# Patient Record
Sex: MALE | Race: WHITE | NOT HISPANIC OR LATINO | URBAN - METROPOLITAN AREA
[De-identification: names, ages, dates, MRNs, and addresses within clinical notes are randomized per-mention and may not be internally consistent; named-entity substitution may affect disease eponyms.]

---

## 2021-04-23 ENCOUNTER — APPOINTMENT (OUTPATIENT)
Dept: GENERAL RADIOLOGY | Facility: HOSPITAL | Age: 21
End: 2021-04-23

## 2021-04-23 ENCOUNTER — HOSPITAL ENCOUNTER (INPATIENT)
Facility: HOSPITAL | Age: 21
LOS: 2 days | Discharge: HOME | End: 2021-04-25
Attending: INTERNAL MEDICINE | Admitting: INTERNAL MEDICINE

## 2021-04-23 ENCOUNTER — APPOINTMENT (OUTPATIENT)
Dept: MRI IMAGING | Facility: HOSPITAL | Age: 21
End: 2021-04-23

## 2021-04-23 ENCOUNTER — APPOINTMENT (OUTPATIENT)
Dept: CT IMAGING | Facility: HOSPITAL | Age: 21
End: 2021-04-23

## 2021-04-23 DIAGNOSIS — I63.9 CEREBROVASCULAR ACCIDENT (CVA), UNSPECIFIED MECHANISM (HCC): Primary | ICD-10-CM

## 2021-04-23 DIAGNOSIS — Z74.09 IMPAIRED FUNCTIONAL MOBILITY, BALANCE, GAIT, AND ENDURANCE: ICD-10-CM

## 2021-04-23 PROBLEM — I48.92 ATRIAL FLUTTER (HCC): Status: ACTIVE | Noted: 2021-04-23

## 2021-04-23 PROBLEM — N28.9 RENAL INSUFFICIENCY: Status: ACTIVE | Noted: 2021-04-23

## 2021-04-23 LAB
ALBUMIN SERPL-MCNC: 4.9 G/DL (ref 3.5–5.2)
ALBUMIN/GLOB SERPL: 1.6 G/DL
ALP SERPL-CCNC: 108 U/L (ref 39–117)
ALT SERPL W P-5'-P-CCNC: 38 U/L (ref 1–41)
AMPHET+METHAMPHET UR QL: NEGATIVE
AMPHETAMINES UR QL: NEGATIVE
ANION GAP SERPL CALCULATED.3IONS-SCNC: 14 MMOL/L (ref 5–15)
AST SERPL-CCNC: 19 U/L (ref 1–40)
B PARAPERT DNA SPEC QL NAA+PROBE: NOT DETECTED
B PERT DNA SPEC QL NAA+PROBE: NOT DETECTED
BARBITURATES UR QL SCN: NEGATIVE
BENZODIAZ UR QL SCN: NEGATIVE
BILIRUB SERPL-MCNC: 0.5 MG/DL (ref 0–1.2)
BUN SERPL-MCNC: 15 MG/DL (ref 6–20)
BUN/CREAT SERPL: 12.9 (ref 7–25)
BUPRENORPHINE SERPL-MCNC: NEGATIVE NG/ML
C PNEUM DNA NPH QL NAA+NON-PROBE: NOT DETECTED
CALCIUM SPEC-SCNC: 8.8 MG/DL (ref 8.6–10.5)
CANNABINOIDS SERPL QL: NEGATIVE
CHLORIDE SERPL-SCNC: 100 MMOL/L (ref 98–107)
CO2 SERPL-SCNC: 21 MMOL/L (ref 22–29)
COCAINE UR QL: NEGATIVE
CREAT SERPL-MCNC: 1.16 MG/DL (ref 0.76–1.27)
DEPRECATED RDW RBC AUTO: 38.7 FL (ref 37–54)
ERYTHROCYTE [DISTWIDTH] IN BLOOD BY AUTOMATED COUNT: 12.2 % (ref 12.3–15.4)
FLUAV SUBTYP SPEC NAA+PROBE: NOT DETECTED
FLUBV RNA ISLT QL NAA+PROBE: NOT DETECTED
GFR SERPL CREATININE-BSD FRML MDRD: 79 ML/MIN/1.73
GLOBULIN UR ELPH-MCNC: 3 GM/DL
GLUCOSE BLDC GLUCOMTR-MCNC: 141 MG/DL (ref 70–130)
GLUCOSE SERPL-MCNC: 142 MG/DL (ref 65–99)
HADV DNA SPEC NAA+PROBE: NOT DETECTED
HBA1C MFR BLD: 4.8 % (ref 4.8–5.6)
HCOV 229E RNA SPEC QL NAA+PROBE: NOT DETECTED
HCOV HKU1 RNA SPEC QL NAA+PROBE: NOT DETECTED
HCOV NL63 RNA SPEC QL NAA+PROBE: NOT DETECTED
HCOV OC43 RNA SPEC QL NAA+PROBE: NOT DETECTED
HCT VFR BLD AUTO: 46.9 % (ref 37.5–51)
HGB BLD-MCNC: 16.2 G/DL (ref 13–17.7)
HMPV RNA NPH QL NAA+NON-PROBE: NOT DETECTED
HPIV1 RNA SPEC QL NAA+PROBE: NOT DETECTED
HPIV2 RNA SPEC QL NAA+PROBE: NOT DETECTED
HPIV3 RNA NPH QL NAA+PROBE: NOT DETECTED
HPIV4 P GENE NPH QL NAA+PROBE: NOT DETECTED
M PNEUMO IGG SER IA-ACNC: NOT DETECTED
MCH RBC QN AUTO: 30.2 PG (ref 26.6–33)
MCHC RBC AUTO-ENTMCNC: 34.5 G/DL (ref 31.5–35.7)
MCV RBC AUTO: 87.3 FL (ref 79–97)
METHADONE UR QL SCN: NEGATIVE
OPIATES UR QL: NEGATIVE
OXYCODONE UR QL SCN: NEGATIVE
PCP UR QL SCN: NEGATIVE
PLATELET # BLD AUTO: 308 10*3/MM3 (ref 140–450)
PMV BLD AUTO: 8.5 FL (ref 6–12)
POTASSIUM SERPL-SCNC: 3.8 MMOL/L (ref 3.5–5.2)
PROPOXYPH UR QL: NEGATIVE
PROT SERPL-MCNC: 7.9 G/DL (ref 6–8.5)
RBC # BLD AUTO: 5.37 10*6/MM3 (ref 4.14–5.8)
RHINOVIRUS RNA SPEC NAA+PROBE: NOT DETECTED
RSV RNA NPH QL NAA+NON-PROBE: NOT DETECTED
SARS-COV-2 RNA NPH QL NAA+NON-PROBE: NOT DETECTED
SODIUM SERPL-SCNC: 135 MMOL/L (ref 136–145)
SODIUM UR-SCNC: 65 MMOL/L
TRICYCLICS UR QL SCN: NEGATIVE
TSH SERPL DL<=0.05 MIU/L-ACNC: 0.93 UIU/ML (ref 0.27–4.2)
WBC # BLD AUTO: 16.2 10*3/MM3 (ref 3.4–10.8)

## 2021-04-23 PROCEDURE — 93005 ELECTROCARDIOGRAM TRACING: CPT | Performed by: NURSE PRACTITIONER

## 2021-04-23 PROCEDURE — 70496 CT ANGIOGRAPHY HEAD: CPT

## 2021-04-23 PROCEDURE — 0042T HC CT CEREBRAL PERFUSION W/WO CONTRAST: CPT

## 2021-04-23 PROCEDURE — 84443 ASSAY THYROID STIM HORMONE: CPT | Performed by: NURSE PRACTITIONER

## 2021-04-23 PROCEDURE — 84300 ASSAY OF URINE SODIUM: CPT | Performed by: NURSE PRACTITIONER

## 2021-04-23 PROCEDURE — 0202U NFCT DS 22 TRGT SARS-COV-2: CPT | Performed by: NURSE PRACTITIONER

## 2021-04-23 PROCEDURE — 71045 X-RAY EXAM CHEST 1 VIEW: CPT

## 2021-04-23 PROCEDURE — 25010000002 ALTEPLASE PER 1 MG: Performed by: PSYCHIATRY & NEUROLOGY

## 2021-04-23 PROCEDURE — 0 IOPAMIDOL PER 1 ML: Performed by: INTERNAL MEDICINE

## 2021-04-23 PROCEDURE — 82962 GLUCOSE BLOOD TEST: CPT

## 2021-04-23 PROCEDURE — 82570 ASSAY OF URINE CREATININE: CPT | Performed by: NURSE PRACTITIONER

## 2021-04-23 PROCEDURE — 99223 1ST HOSP IP/OBS HIGH 75: CPT | Performed by: INTERNAL MEDICINE

## 2021-04-23 PROCEDURE — 85027 COMPLETE CBC AUTOMATED: CPT | Performed by: NURSE PRACTITIONER

## 2021-04-23 PROCEDURE — 3E03317 INTRODUCTION OF OTHER THROMBOLYTIC INTO PERIPHERAL VEIN, PERCUTANEOUS APPROACH: ICD-10-PCS | Performed by: INTERNAL MEDICINE

## 2021-04-23 PROCEDURE — 70498 CT ANGIOGRAPHY NECK: CPT

## 2021-04-23 PROCEDURE — 70450 CT HEAD/BRAIN W/O DYE: CPT

## 2021-04-23 PROCEDURE — 70551 MRI BRAIN STEM W/O DYE: CPT

## 2021-04-23 PROCEDURE — 80053 COMPREHEN METABOLIC PANEL: CPT | Performed by: NURSE PRACTITIONER

## 2021-04-23 PROCEDURE — 83036 HEMOGLOBIN GLYCOSYLATED A1C: CPT | Performed by: NURSE PRACTITIONER

## 2021-04-23 PROCEDURE — 80306 DRUG TEST PRSMV INSTRMNT: CPT | Performed by: NURSE PRACTITIONER

## 2021-04-23 PROCEDURE — 25010000002 KETOROLAC TROMETHAMINE PER 15 MG: Performed by: NURSE PRACTITIONER

## 2021-04-23 RX ORDER — ASPIRIN 325 MG
325 TABLET ORAL DAILY
Status: DISCONTINUED | OUTPATIENT
Start: 2021-04-24 | End: 2021-04-24

## 2021-04-23 RX ORDER — SODIUM CHLORIDE 0.9 % (FLUSH) 0.9 %
10 SYRINGE (ML) INJECTION AS NEEDED
Status: DISCONTINUED | OUTPATIENT
Start: 2021-04-23 | End: 2021-04-25 | Stop reason: HOSPADM

## 2021-04-23 RX ORDER — SODIUM CHLORIDE 9 MG/ML
100 INJECTION, SOLUTION INTRAVENOUS ONCE
Status: COMPLETED | OUTPATIENT
Start: 2021-04-23 | End: 2021-04-23

## 2021-04-23 RX ORDER — ASPIRIN 300 MG/1
300 SUPPOSITORY RECTAL DAILY
Status: DISCONTINUED | OUTPATIENT
Start: 2021-04-24 | End: 2021-04-24

## 2021-04-23 RX ORDER — ACETAMINOPHEN 650 MG/1
650 SUPPOSITORY RECTAL EVERY 4 HOURS PRN
Status: DISCONTINUED | OUTPATIENT
Start: 2021-04-23 | End: 2021-04-25 | Stop reason: HOSPADM

## 2021-04-23 RX ORDER — ATORVASTATIN CALCIUM 40 MG/1
80 TABLET, FILM COATED ORAL NIGHTLY
Status: DISCONTINUED | OUTPATIENT
Start: 2021-04-23 | End: 2021-04-25

## 2021-04-23 RX ORDER — SODIUM CHLORIDE 0.9 % (FLUSH) 0.9 %
10 SYRINGE (ML) INJECTION EVERY 12 HOURS SCHEDULED
Status: DISCONTINUED | OUTPATIENT
Start: 2021-04-23 | End: 2021-04-25 | Stop reason: HOSPADM

## 2021-04-23 RX ORDER — LORAZEPAM 2 MG/ML
1 INJECTION INTRAMUSCULAR ONCE
Status: DISCONTINUED | OUTPATIENT
Start: 2021-04-23 | End: 2021-04-24

## 2021-04-23 RX ORDER — KETOROLAC TROMETHAMINE 15 MG/ML
15 INJECTION, SOLUTION INTRAMUSCULAR; INTRAVENOUS ONCE
Status: COMPLETED | OUTPATIENT
Start: 2021-04-23 | End: 2021-04-23

## 2021-04-23 RX ADMIN — IOPAMIDOL 115 ML: 755 INJECTION, SOLUTION INTRAVENOUS at 18:27

## 2021-04-23 RX ADMIN — KETOROLAC TROMETHAMINE 15 MG: 15 INJECTION, SOLUTION INTRAMUSCULAR; INTRAVENOUS at 20:08

## 2021-04-23 RX ADMIN — SODIUM CHLORIDE 100 ML: 9 INJECTION, SOLUTION INTRAVENOUS at 20:12

## 2021-04-23 RX ADMIN — SODIUM CHLORIDE, PRESERVATIVE FREE 10 ML: 5 INJECTION INTRAVENOUS at 21:07

## 2021-04-23 NOTE — H&P
Intensive Care Admission Note     CVA (cerebral vascular accident) (CMS/[facility])    History of Present Illness     Cristian Perez is a 20 y.o. male who presented to [facility] ED @ 1523 w/ acute onset (1503) of R facial droop, L facial numbness, HA, & LUE weakness/parasthesias.  He was found to be in atrial flutter as well which is a new finding.  NIHSS was 4.  He was transferred to our facility and was initially admitted to the floor as imaging did not reveal any acute findings.    We were contacted by the CVA Navigator and informed that Neurology felt there was a focal lesion and he would benefit from tPA.  He is being transferred to the ICU.    In speaking w/ the patient he denies any H/O medical issues.  He said that he had some pain in his chest ~ 2 weeks ago that was self-limiting, otherwise he has been in his regular level of health.     Problem List, Surgical History, Family, Social History, and ROS     CVA (cerebral vascular accident), possible, status post TPA 4/23/2021     Atrial flutter (CMS/[facility])    Renal insufficiency, based upon outside hospital labs    No past surgical history on file.    Allergies   Allergen Reactions   • Hydralazine Shortness Of Breath     Tachycardia, rash, and chest pain       No current facility-administered medications on file prior to encounter.     No current outpatient medications on file prior to encounter.     MEDICATION LIST AND ALLERGIES REVIEWED.    No family history on file.  Social History     Tobacco Use   • Smoking status: Never Smoker   • Smokeless tobacco: Never Used   Substance Use Topics   • Alcohol use: Not on file   • Drug use: Not on file     Social History     Social History Narrative   • Not on file     FAMILY AND SOCIAL HISTORY REVIEWED.    Review of Systems   Neurological: Positive for facial asymmetry, weakness, numbness and headaches.     ALL OTHER SYSTEMS REVIEWED AND ARE NEGATIVE.     Physical Exam and Clinical Information   BP (!) 157/101   Pulse 104    "Temp 99.2 °F (37.3 °C) (Oral)   Resp 16   Ht 182.9 cm (72\")   Wt 112 kg (247 lb 2.2 oz)   SpO2 96%   BMI 33.52 kg/m²   Physical Exam  Vitals reviewed.   Constitutional:       General: He is not in acute distress.     Appearance: Normal appearance. He is normal weight. He is not ill-appearing or diaphoretic.   HENT:      Head: Normocephalic and atraumatic.      Nose: Nose normal. No congestion.      Mouth/Throat:      Mouth: Mucous membranes are moist.      Pharynx: No oropharyngeal exudate.   Eyes:      Extraocular Movements: Extraocular movements intact.      Conjunctiva/sclera: Conjunctivae normal.      Pupils: Pupils are equal, round, and reactive to light.   Cardiovascular:      Rate and Rhythm: Regular rhythm. Tachycardia present.      Pulses: Normal pulses.      Heart sounds: Murmur heard.     Pulmonary:      Effort: Pulmonary effort is normal. No respiratory distress.      Breath sounds: Normal breath sounds. No stridor. No wheezing, rhonchi or rales.   Chest:      Chest wall: No tenderness.   Abdominal:      General: Abdomen is flat. Bowel sounds are normal. There is no distension.      Tenderness: There is no abdominal tenderness.   Musculoskeletal:         General: No swelling.      Cervical back: Normal range of motion and neck supple. No rigidity.      Comments: Patient has approximately 3 out of 5  strength in his left upper extremity.  No facial droop is noted at this time.   Skin:     General: Skin is warm.      Capillary Refill: Capillary refill takes less than 2 seconds.   Neurological:      Mental Status: He is alert.   Psychiatric:         Mood and Affect: Mood normal.             I reviewed the patient's results and images.     Impression       CVA (cerebral vascular accident), possible, status post TPA 4/23/2021     Atrial flutter (CMS/HCC)    Renal insufficiency, based upon outside hospital labs      Plan/Recommendations     - Neuro ICU admission.  - CVA Navigator to order tPA & " post-tPA protocol; close neurovascular changes.  - 24 H post-tPA head CT to r/o bleed & prn for neuro changes  - STAT labs, urine studies, UDS now.  - check ECG, ECHO in am  - Pending the studies, further work-up may be necessary including cardiology evaluation.    Patient is at high risk of worsening secondary to possible acute ischemic stroke and will need to be watched very closely secondary to TPA administration.    Sai Medina MD, [facility]  Pulmonary and Critical Care Medicine  04/23/21 20:10 EDT         CC: Provider, No Known

## 2021-04-23 NOTE — NURSING NOTE
"Stroke Navigator CODE STROKE    TIME NOTIFIED OF PATIENTS ARRIVAL 1813, TIME OF PATIENT EVALUATION 1813    PHOENIX Perez is a 20 y.o. right-handed male with no known medical history whom I am evaluating as a transfer from [facility] for possible acute stroke.     Patient presented to OSH with complaints of a sudden onset of left sided weakness, paraesthesia, and right facial droop which began at approximately 1500 while driving home from work.  He presented to his local ED where he underwent a CT head without contrast.  This was negative for hemorrhage or acute process.  He was also noted to be hypertensive upon arrival with SBP in the 190's.  He was given IV hydralazine for blood pressure management and experienced an allergic reaction including tachycardia, rash, chest pain, and shortness of breath.  He was treated with IV benadryl and solumedrol.  EKG was obtained and was interpreted by the ED physician who states he was in atrial flutter, new for the patient.  The stroke team was contacted and he was accepted for transfer.  The patient was discussed with Dr. Chapman who recommended IV alteplase treatment.  The ED physician at the OSH discussed with the patient and he refused treatment as \"he was afraid he would have an anaphylactic reaction\".      The patient was airlifted to our facility and upon arrival was taken immediately to the CT scanner for CTA head/neck and CT perfusion scan.  On assessment the patient is alert, oriented, and follows commands.  Pupils are equal and round.  EOMI with no evidence of visual field deficits.  There is no evidence of deviation however the patient does seem to have right gaze preference. He has right eye ptosis and right facial droop.  Tongue is midline.  Speech is mildly dysarthric at times but fluent with no evidence of aphasia.  He has full range of motion of his right upper and lower extremity.  He has mild drift in his left upper and lower extremity.  " Sensation is decreased in the left face, upper extremity, and lower extremity.  Finger-to-nose and heel-to-shin are negative for ataxia.  He does complain of a mild bifrontal headache at this time.  Blood pressure is 145/90 in the CT scanner.  Gait is unsteady.    CTA head and neck as well as CT perfusion is negative for flow-limiting stenosis or large vessel occlusion however due to his significant left-sided deficits he was taken for stat MRI of the brain without contrast to further evaluate for diffusion deficits as he is still apprehensive about receiving IV alteplase therapy.  MRI was reviewed by Dr. Reid, Dr. Spain, and Dr. Major.  This does reveal a small abnormality within the right pontine region, which could be artifact however due to the patient's debilitating symptoms it was recommended that the patient proceed with IV alteplase administration.  After completing the IV alteplase checklist with the patient as well as reviewing the risks/benefits, he agreed to proceed with administration.  Blood pressure prior to bolus was 156/96.  He was transferred to the neurological ICU for further evaluation.  Patient was discussed with Dr. Medina and he was transferred from MRI to the intensive care unit with the floor nurse.    AFTER REVIEWING THE PATIENT'S EKG FROM OSH THERE IS NO EVIDENCE OF ATRIAL FLUTTER.    Code Stroke location: Transfer    · Last known well: 1500    · GCS: 15  · Baseline level of function known: yes. Modified Cresencio: 0   · Current symptoms include; extremity weakness, extremity numbness, facial droop, dysarthria and headache, affecting primarily the left upper extremity and lower extremity. Facial droop is present on right.  Symptoms are currently unchanged.   · Patient is a candidate for thrombolytic therapy.  There was a delay in IV alteplase administration secondary to patient being hesitant to receive therapy.  · Patient is not a candidate for Neuro Intervention due to no LVO (large  vessel occlusion) or flow-limiting stenosis present on the CTA head/neck or CT perfusion scan.    Stroke risk factors: hypertension and physical inactivity and/or obesity.     Prior stroke history: no    Antiplatelet therapy: none  Anticoagulation: none     · Imaging performed: CT head, MRI brain, CTA head, CTA neck and CT perfusion      NIHSS    Interval: baseline  1a. Level of Consciousness: 0-->Alert, keenly responsive  1b. LOC Questions: 0-->Answers both questions correctly  1c. LOC Commands: 0-->Performs both tasks correctly  2. Best Gaze: 0-->Normal  3. Visual: 0-->No visual loss  4. Facial Palsy: 2-->Partial paralysis (total or near-total paralysis of lower face)  5a. Motor Arm, Left: 1-->Drift, limb holds 90 (or 45) degrees, but drifts down before full 10 seconds, does not hit bed or other support  5b. Motor Arm, Right: 0-->No drift, limb holds 90 (or 45) degrees for full 10 secs  6a. Motor Leg, Left: 1-->Drift, leg falls by the end of the 5-sec period but does not hit bed  6b. Motor Leg, Right: 0-->No drift, leg holds 30 degree position for full 5 secs  7. Limb Ataxia: 0-->Absent  8. Sensory: 1-->Mild-to-moderate sensory loss, patient feels pinprick is less sharp or is dull on the affected side, or there is a loss of superficial pain with pinprick, but patient is aware of being touched  9. Best Language: 0-->No aphasia, normal  10. Dysarthria: 0-->Normal  11. Extinction and Inattention (formerly Neglect): 0-->No abnormality    Total (NIH Stroke Scale): 5       PLAN    There is no evidence of flow limiting stenosis or large vessel occlusion ischemic stroke present on the CTA head/neck or CT perfusion scan.  As such, there is no role for acute neuro intervention.        Due to the patient's bilateral symptoms, cannot exclude brainstem stroke therefore he will be taken for stat MRI of the brain without contrast.  This was completed and reviewed by Dr. Spain and Dr. Major at 1902.  This demonstrates a  potential small area of restricted diffusion within the right pontine area which may be artifact, however due to the patient's persistent and disabling symptoms and low risk for intracerebral hemorrhage IV alteplase is recommended.      After completing the IV alteplase checklist and discussing risks/benefits with the patient, on several occasions, he is agreeable to proceed with treatment.  Repeat BP is 156/96.  IV alteplase bolus was started at 1914.    TIA/CVA with thrombolytic therapy has been initiated  NPO until nursing dysphagia screen completed  2D echocardiogram in a.m.  A1c and LDL in a.m.  Bedrest for tonight  Repeat CT head on 4/24 at 2015  ASA 325mg after 24 hour CT scan if no evidence of hemorrhage  Lipitor 80mg nightly    Formal consult to follow in AM.    Barbie Maya RN EXTENDER/STROKE NAVIGATOR

## 2021-04-23 NOTE — NURSING NOTE
ACC REVIEW REPORT: [facility]        PATIENT NAME: Cristian Perez    PATIENT ID: [id number]      COVID-19 ACC SCREENING       DOES THE PATIENT HAVE A FEVER GREATER THAN OR EQUAL .4: no    IS THE PATIENT EXPERIENCING SHORTNESS OF BREATH: no    DOES THE PATIENT HAVE A COUGH: no    DOES THE PATIENT HAVE ANY OF THE FOLLOWING RISK FACTORS:    EXPOSURE TO SUSPECTED OR KNOWN COVID-19: no    RECENT TRAVEL HISTORY TO ENDEMIC AREA (DOMESTIC/LOCAL): no    IS THE PATIENT A HEALTHCARE WORKER: no    HAS THE PATIENT EXPERIENCED A LOSS OF SENSE OF TASTE OR SMELL:  unknown    HAS THE PATIENT BEEN TESTED FOR COVID-19: yes    DATE TESTED: 2mo ago    LAB TESTING SENT TO: unknown - results neg      BED: S 337    BED TYPE: telemetry    BED GIVEN TO: Rossy in the ED    TIME BED GIVEN: 1715    TODAY'S DATE: 4/23/2021    TRANSFER DATE: 4-23-21    ETA: pending [employer]    TRANSFERRING FACILITY: [facility] PHONE # : [phone]    TRANSFERRING MD: Marion Cramer    ACCEPTING PROVIDER: MELIATOR  Saida)    NEUROLOGY PHYSICIAN: Adela    DATE/TIME REQUEST RECEIVED: 4-23-21@[address] [place] [address] RN: Natalee Milligan    REPORT FROM: Rossy    TIME REPORT TAKEN: 1705    DIAGNOSIS: CVA    REASON FOR TRANSFER TO [facility]: higher level of care    TRANSPORTATION: flying - [employer]    CLINICAL REASON FOR TRANSFER TO [facility]: 20 y.o. presented to [facility] ED at 1523 - he developed sx at 1503 - rt facial droop, left facial numbness, LUE weakness/parasthesias  Also found to have aflutter  Pt has no known medical hx      CLINICAL INFORMATION    HEIGHT: 6'    WEIGHT: 240#    ALLERGIES: sulfa, demerol    INFECTIOUS DISEASE:     ISOLATION:     VITAL SIGNS:   TIME: 1710  TEMP: 98.5  PULSE: 119  B/P: 201/103  RESP: 18      LAB INFORMATION: bun 17, Cr 1.35, glucose 97, PT/INR: 9.8/0.95, PTT 26.5    CULTURE INFORMATION:     MEDS/IV FLUIDS: #18 left ac  meds given:  Hydralazine 20mg IVP at 1540  Diltiazem 10mg at 1608  Solumedrol  Diltiazem  gtt @ 10mg  20mg labatolol IV @ 1710  Benadryl 25mg IV @ 1630    (benadryl & solumedrol given for possible suspicion of reaction to meds as pt developed flushing of the face)      CARDIAC SYSTEM:    CHEST PAIN: no    RHYTHM: a flutter    Is patient taking or has patient been given any drugs that could increase bleeding? no    CARDIAC NOTES: new arrythmia      RESPIRATORY SYSTEM:    OXYGEN: no    O2 SAT: 99% on RA    RESPIRATORY STATUS: no soa      CNS/MUSCULOSKELETAL      LAST KNOWN WELL: 4/23@1503          NIHSS    Survey Item  0: Means Alert  1: Drowsy or Answer Correctly  2: Incorrect, Forced, Can't Resist Gravity  3: Complete or No Effort  4: No Movement  NT: Not Testable Acceptable As Noted Above      1A: Level of Consciousness: 0    1B: LOC Questions (month, age) : 0    1C: LOC Commands (open/close eyes, make a fist & let go): 0    2:  Best Gaze (eyes open-pt follows examiner's fingers or face): 0    3:  Visual (introduce visual stimulus/threat to pt's visual field quad. Cover 1 eye and hold up fingers in all 4 quadrants) : 0    4.  Facial Palsy (show teeth, raise eyebrows and squeeze eyes tightly shut): 3    5A: Motor Arm-Left (elevate extremity to 90 degrees and score drift/movement.  Count to 10 aloud and use fingers for visual cue): 1    5B:  Motor Arm-Right (elevate extremity to 90 degrees and score drift/movement.  Count to 10 aloud and use fingers for visual cue): 0    6A:  Motor Leg-Left (elevate extremity to 30 degrees and score drift/movement.  Count to 5 out loud and use fingers for visual cue): 0    6B:  Motor Leg-Right (elevate extremity to 30 degrees and score drift/movement.  Count to 5 out loud and use fingers for visual cue): 0    7:  Limb Ataxia- finger to nose, heel down shin: 0    8:  Sensory- pin prick to face, arms, trunk, and legs. Compare sharpness side to side: 0    9:  Best Language- name, items, describe picture, and read sentences.  Do not forget glasses if they normally wear them:  0    10: Dysarthria- elevate speech clarity by pt reading or repeating words on a list: 0    11: Extinction and Inattention- Use information from prior testing or double simultaneous stimuli testing to identify neglect. Face, arms, legs and visual field: 0    Total NIHSS Score: 4  Date: 4-23-21  Time of NIHSS Assessment: 1523    CAT SCAN RESULTS: neg    CNS/MUSCULOSKELETAL NOTES: facial drooping has worsened since arrival; pt was able to ambulate to stretcher      GI//GY      ABDOMINAL PAIN: none reported    VOMITING: no    DIARRHEA: no    NAUSEA: no    PAST MEDICAL HISTORY: no medical hx      Melisa Milligan, RN  4/23/2021  17:14 EDT

## 2021-04-24 ENCOUNTER — APPOINTMENT (OUTPATIENT)
Dept: MRI IMAGING | Facility: HOSPITAL | Age: 21
End: 2021-04-24

## 2021-04-24 ENCOUNTER — APPOINTMENT (OUTPATIENT)
Dept: CARDIOLOGY | Facility: HOSPITAL | Age: 21
End: 2021-04-24

## 2021-04-24 LAB
ANION GAP SERPL CALCULATED.3IONS-SCNC: 13 MMOL/L (ref 5–15)
BH CV ECHO MEAS - AO MAX PG (FULL): 4.2 MMHG
BH CV ECHO MEAS - AO MAX PG: 8.4 MMHG
BH CV ECHO MEAS - AO MEAN PG (FULL): 2.4 MMHG
BH CV ECHO MEAS - AO MEAN PG: 4.3 MMHG
BH CV ECHO MEAS - AO ROOT AREA (BSA CORRECTED): 1.3
BH CV ECHO MEAS - AO ROOT AREA: 7.3 CM^2
BH CV ECHO MEAS - AO ROOT DIAM: 3 CM
BH CV ECHO MEAS - AO V2 MAX: 145 CM/SEC
BH CV ECHO MEAS - AO V2 MEAN: 94.6 CM/SEC
BH CV ECHO MEAS - AO V2 VTI: 34.3 CM
BH CV ECHO MEAS - ASC AORTA: 2.9 CM
BH CV ECHO MEAS - AVA(I,A): 2.3 CM^2
BH CV ECHO MEAS - AVA(I,D): 2.3 CM^2
BH CV ECHO MEAS - AVA(V,A): 2.6 CM^2
BH CV ECHO MEAS - AVA(V,D): 2.6 CM^2
BH CV ECHO MEAS - BSA(HAYCOCK): 2.4 M^2
BH CV ECHO MEAS - BSA: 2.3 M^2
BH CV ECHO MEAS - BZI_BMI: 33.4 KILOGRAMS/M^2
BH CV ECHO MEAS - BZI_METRIC_HEIGHT: 182.9 CM
BH CV ECHO MEAS - BZI_METRIC_WEIGHT: 111.6 KG
BH CV ECHO MEAS - CI(CUBED): 2.6 L/MIN/M^2
BH CV ECHO MEAS - CI(MOD-SP2): 2.4 L/MIN/M^2
BH CV ECHO MEAS - CI(MOD-SP4): 3.8 L/MIN/M^2
BH CV ECHO MEAS - CI(TEICH): 2.3 L/MIN/M^2
BH CV ECHO MEAS - CO(CUBED): 6 L/MIN
BH CV ECHO MEAS - CO(MOD-SP2): 5.7 L/MIN
BH CV ECHO MEAS - CO(MOD-SP4): 8.8 L/MIN
BH CV ECHO MEAS - CO(TEICH): 5.3 L/MIN
BH CV ECHO MEAS - EDV(CUBED): 112.4 ML
BH CV ECHO MEAS - EDV(MOD-SP2): 119 ML
BH CV ECHO MEAS - EDV(MOD-SP4): 165 ML
BH CV ECHO MEAS - EDV(TEICH): 108.9 ML
BH CV ECHO MEAS - EF(CUBED): 82.3 %
BH CV ECHO MEAS - EF(MOD-BP): 79 %
BH CV ECHO MEAS - EF(MOD-SP2): 73.1 %
BH CV ECHO MEAS - EF(MOD-SP4): 82.4 %
BH CV ECHO MEAS - EF(TEICH): 75 %
BH CV ECHO MEAS - ESV(CUBED): 19.8 ML
BH CV ECHO MEAS - ESV(MOD-SP2): 32 ML
BH CV ECHO MEAS - ESV(MOD-SP4): 29 ML
BH CV ECHO MEAS - ESV(TEICH): 27.2 ML
BH CV ECHO MEAS - FS: 43.9 %
BH CV ECHO MEAS - IVS/LVPW: 0.96
BH CV ECHO MEAS - IVSD: 0.86 CM
BH CV ECHO MEAS - LA DIMENSION: 4 CM
BH CV ECHO MEAS - LA/AO: 1.3
BH CV ECHO MEAS - LAD MAJOR: 4.8 CM
BH CV ECHO MEAS - LAT PEAK E' VEL: 14.3 CM/SEC
BH CV ECHO MEAS - LATERAL E/E' RATIO: 6.7
BH CV ECHO MEAS - LV DIASTOLIC VOL/BSA (35-75): 70.9 ML/M^2
BH CV ECHO MEAS - LV MASS(C)D: 143.7 GRAMS
BH CV ECHO MEAS - LV MASS(C)DI: 61.8 GRAMS/M^2
BH CV ECHO MEAS - LV MAX PG: 4.2 MMHG
BH CV ECHO MEAS - LV MEAN PG: 2 MMHG
BH CV ECHO MEAS - LV SYSTOLIC VOL/BSA (12-30): 12.5 ML/M^2
BH CV ECHO MEAS - LV V1 MAX: 102.4 CM/SEC
BH CV ECHO MEAS - LV V1 MEAN: 64 CM/SEC
BH CV ECHO MEAS - LV V1 VTI: 21.6 CM
BH CV ECHO MEAS - LVIDD: 4.8 CM
BH CV ECHO MEAS - LVIDS: 2.7 CM
BH CV ECHO MEAS - LVLD AP2: 8.1 CM
BH CV ECHO MEAS - LVLD AP4: 8.7 CM
BH CV ECHO MEAS - LVLS AP2: 6.7 CM
BH CV ECHO MEAS - LVLS AP4: 6.6 CM
BH CV ECHO MEAS - LVOT AREA (M): 3.8 CM^2
BH CV ECHO MEAS - LVOT AREA: 3.7 CM^2
BH CV ECHO MEAS - LVOT DIAM: 2.2 CM
BH CV ECHO MEAS - LVPWD: 0.89 CM
BH CV ECHO MEAS - MED PEAK E' VEL: 10.9 CM/SEC
BH CV ECHO MEAS - MEDIAL E/E' RATIO: 8.7
BH CV ECHO MEAS - MM HR: 65 BPM
BH CV ECHO MEAS - MM R-R INT: 0.92 SEC
BH CV ECHO MEAS - MR MAX PG: 88 MMHG
BH CV ECHO MEAS - MR MAX VEL: 466.9 CM/SEC
BH CV ECHO MEAS - MV A MAX VEL: 52.8 CM/SEC
BH CV ECHO MEAS - MV DEC TIME: 0.19 SEC
BH CV ECHO MEAS - MV E MAX VEL: 96.7 CM/SEC
BH CV ECHO MEAS - MV E/A: 1.8
BH CV ECHO MEAS - MV MAX PG: 3.8 MMHG
BH CV ECHO MEAS - MV MEAN PG: 1.4 MMHG
BH CV ECHO MEAS - MV V2 MAX: 97 CM/SEC
BH CV ECHO MEAS - MV V2 MEAN: 55 CM/SEC
BH CV ECHO MEAS - MV V2 VTI: 29.4 CM
BH CV ECHO MEAS - MVA(VTI): 2.7 CM^2
BH CV ECHO MEAS - PA ACC SLOPE: 586.5 CM/SEC^2
BH CV ECHO MEAS - PA ACC TIME: 0.2 SEC
BH CV ECHO MEAS - PA MAX PG: 7.7 MMHG
BH CV ECHO MEAS - PA PR(ACCEL): -10.5 MMHG
BH CV ECHO MEAS - PA V2 MAX: 138.5 CM/SEC
BH CV ECHO MEAS - RAP SYSTOLE: 3 MMHG
BH CV ECHO MEAS - RVDD: 2 CM
BH CV ECHO MEAS - RVSP: 23 MMHG
BH CV ECHO MEAS - SI(AO): 107 ML/M^2
BH CV ECHO MEAS - SI(CUBED): 39.8 ML/M^2
BH CV ECHO MEAS - SI(LVOT): 34.3 ML/M^2
BH CV ECHO MEAS - SI(MOD-SP2): 37.4 ML/M^2
BH CV ECHO MEAS - SI(MOD-SP4): 58.5 ML/M^2
BH CV ECHO MEAS - SI(TEICH): 35.1 ML/M^2
BH CV ECHO MEAS - SV(AO): 249 ML
BH CV ECHO MEAS - SV(CUBED): 92.6 ML
BH CV ECHO MEAS - SV(LVOT): 79.8 ML
BH CV ECHO MEAS - SV(MOD-SP2): 87 ML
BH CV ECHO MEAS - SV(MOD-SP4): 136 ML
BH CV ECHO MEAS - SV(TEICH): 81.7 ML
BH CV ECHO MEAS - TAPSE (>1.6): 3 CM
BH CV ECHO MEAS - TR MAX PG: 20 MMHG
BH CV ECHO MEAS - TR MAX VEL: 221.6 CM/SEC
BH CV ECHO MEAS - TV MAX PG: 2.8 MMHG
BH CV ECHO MEAS - TV V2 MAX: 83 CM/SEC
BH CV ECHO MEASUREMENTS AVERAGE E/E' RATIO: 7.67
BH CV VAS BP RIGHT ARM: NORMAL MMHG
BH CV XLRA - RV BASE: 3.1 CM
BH CV XLRA - RV LENGTH: 5.8 CM
BH CV XLRA - RV MID: 2.9 CM
BH CV XLRA - TDI S': 16.1 CM/SEC
BUN SERPL-MCNC: 17 MG/DL (ref 6–20)
BUN/CREAT SERPL: 13.9 (ref 7–25)
CALCIUM SPEC-SCNC: 8.9 MG/DL (ref 8.6–10.5)
CHLORIDE SERPL-SCNC: 100 MMOL/L (ref 98–107)
CHOLEST SERPL-MCNC: 229 MG/DL (ref 0–200)
CO2 SERPL-SCNC: 22 MMOL/L (ref 22–29)
CREAT SERPL-MCNC: 1.22 MG/DL (ref 0.76–1.27)
CREAT UR-MCNC: 41.9 MG/DL
D DIMER PPP FEU-MCNC: 0.4 MCGFEU/ML (ref 0–0.56)
DEPRECATED RDW RBC AUTO: 40.2 FL (ref 37–54)
ERYTHROCYTE [DISTWIDTH] IN BLOOD BY AUTOMATED COUNT: 12.4 % (ref 12.3–15.4)
GFR SERPL CREATININE-BSD FRML MDRD: 74 ML/MIN/1.73
GLUCOSE BLDC GLUCOMTR-MCNC: 137 MG/DL (ref 70–130)
GLUCOSE SERPL-MCNC: 149 MG/DL (ref 65–99)
HCT VFR BLD AUTO: 45 % (ref 37.5–51)
HCYS SERPL-MCNC: 7.1 UMOL/L (ref 0–15)
HDLC SERPL-MCNC: 60 MG/DL (ref 40–60)
HGB BLD-MCNC: 15.3 G/DL (ref 13–17.7)
LDLC SERPL CALC-MCNC: 159 MG/DL (ref 0–100)
LDLC/HDLC SERPL: 2.62 {RATIO}
LEFT ATRIUM VOLUME INDEX: 13.8 ML/M^2
LEFT ATRIUM VOLUME: 32 ML
MAGNESIUM SERPL-MCNC: 1.9 MG/DL (ref 1.6–2.6)
MAXIMAL PREDICTED HEART RATE: 198 BPM
MCH RBC QN AUTO: 30.1 PG (ref 26.6–33)
MCHC RBC AUTO-ENTMCNC: 34 G/DL (ref 31.5–35.7)
MCV RBC AUTO: 88.4 FL (ref 79–97)
PHOSPHATE SERPL-MCNC: 4.8 MG/DL (ref 2.5–4.5)
PLATELET # BLD AUTO: 293 10*3/MM3 (ref 140–450)
PMV BLD AUTO: 9 FL (ref 6–12)
POTASSIUM SERPL-SCNC: 4.3 MMOL/L (ref 3.5–5.2)
PROCALCITONIN SERPL-MCNC: 0.04 NG/ML (ref 0–0.25)
RBC # BLD AUTO: 5.09 10*6/MM3 (ref 4.14–5.8)
SODIUM SERPL-SCNC: 135 MMOL/L (ref 136–145)
STRESS TARGET HR: 168 BPM
TRIGL SERPL-MCNC: 58 MG/DL (ref 0–150)
VLDLC SERPL-MCNC: 10 MG/DL (ref 5–40)
WBC # BLD AUTO: 6.7 10*3/MM3 (ref 3.4–10.8)

## 2021-04-24 PROCEDURE — 85305 CLOT INHIBIT PROT S TOTAL: CPT | Performed by: PSYCHIATRY & NEUROLOGY

## 2021-04-24 PROCEDURE — 85027 COMPLETE CBC AUTOMATED: CPT | Performed by: NURSE PRACTITIONER

## 2021-04-24 PROCEDURE — 97165 OT EVAL LOW COMPLEX 30 MIN: CPT

## 2021-04-24 PROCEDURE — 97161 PT EVAL LOW COMPLEX 20 MIN: CPT

## 2021-04-24 PROCEDURE — 83090 ASSAY OF HOMOCYSTEINE: CPT | Performed by: PSYCHIATRY & NEUROLOGY

## 2021-04-24 PROCEDURE — 99253 IP/OBS CNSLTJ NEW/EST LOW 45: CPT | Performed by: PSYCHIATRY & NEUROLOGY

## 2021-04-24 PROCEDURE — 84145 PROCALCITONIN (PCT): CPT | Performed by: INTERNAL MEDICINE

## 2021-04-24 PROCEDURE — 85379 FIBRIN DEGRADATION QUANT: CPT | Performed by: PSYCHIATRY & NEUROLOGY

## 2021-04-24 PROCEDURE — 92610 EVALUATE SWALLOWING FUNCTION: CPT

## 2021-04-24 PROCEDURE — 84100 ASSAY OF PHOSPHORUS: CPT | Performed by: NURSE PRACTITIONER

## 2021-04-24 PROCEDURE — 70551 MRI BRAIN STEM W/O DYE: CPT

## 2021-04-24 PROCEDURE — 81241 F5 GENE: CPT | Performed by: PSYCHIATRY & NEUROLOGY

## 2021-04-24 PROCEDURE — 86147 CARDIOLIPIN ANTIBODY EA IG: CPT | Performed by: PSYCHIATRY & NEUROLOGY

## 2021-04-24 PROCEDURE — 85300 ANTITHROMBIN III ACTIVITY: CPT | Performed by: PSYCHIATRY & NEUROLOGY

## 2021-04-24 PROCEDURE — 85303 CLOT INHIBIT PROT C ACTIVITY: CPT | Performed by: PSYCHIATRY & NEUROLOGY

## 2021-04-24 PROCEDURE — 97110 THERAPEUTIC EXERCISES: CPT

## 2021-04-24 PROCEDURE — 99233 SBSQ HOSP IP/OBS HIGH 50: CPT | Performed by: INTERNAL MEDICINE

## 2021-04-24 PROCEDURE — 83520 IMMUNOASSAY QUANT NOS NONAB: CPT | Performed by: PSYCHIATRY & NEUROLOGY

## 2021-04-24 PROCEDURE — 86038 ANTINUCLEAR ANTIBODIES: CPT | Performed by: PSYCHIATRY & NEUROLOGY

## 2021-04-24 PROCEDURE — 83735 ASSAY OF MAGNESIUM: CPT | Performed by: NURSE PRACTITIONER

## 2021-04-24 PROCEDURE — 85220 BLOOC CLOT FACTOR V TEST: CPT | Performed by: PSYCHIATRY & NEUROLOGY

## 2021-04-24 PROCEDURE — 85732 THROMBOPLASTIN TIME PARTIAL: CPT | Performed by: PSYCHIATRY & NEUROLOGY

## 2021-04-24 PROCEDURE — 86148 ANTI-PHOSPHOLIPID ANTIBODY: CPT | Performed by: PSYCHIATRY & NEUROLOGY

## 2021-04-24 PROCEDURE — 92523 SPEECH SOUND LANG COMPREHEN: CPT

## 2021-04-24 PROCEDURE — 85302 CLOT INHIBIT PROT C ANTIGEN: CPT | Performed by: PSYCHIATRY & NEUROLOGY

## 2021-04-24 PROCEDURE — 85705 THROMBOPLASTIN INHIBITION: CPT | Performed by: PSYCHIATRY & NEUROLOGY

## 2021-04-24 PROCEDURE — 80061 LIPID PANEL: CPT | Performed by: PSYCHIATRY & NEUROLOGY

## 2021-04-24 PROCEDURE — 85670 THROMBIN TIME PLASMA: CPT | Performed by: PSYCHIATRY & NEUROLOGY

## 2021-04-24 PROCEDURE — 85613 RUSSELL VIPER VENOM DILUTED: CPT | Performed by: PSYCHIATRY & NEUROLOGY

## 2021-04-24 PROCEDURE — 85306 CLOT INHIBIT PROT S FREE: CPT | Performed by: PSYCHIATRY & NEUROLOGY

## 2021-04-24 PROCEDURE — 80048 BASIC METABOLIC PNL TOTAL CA: CPT | Performed by: NURSE PRACTITIONER

## 2021-04-24 PROCEDURE — 97116 GAIT TRAINING THERAPY: CPT

## 2021-04-24 PROCEDURE — 81240 F2 GENE: CPT | Performed by: PSYCHIATRY & NEUROLOGY

## 2021-04-24 PROCEDURE — 82962 GLUCOSE BLOOD TEST: CPT

## 2021-04-24 PROCEDURE — 93306 TTE W/DOPPLER COMPLETE: CPT

## 2021-04-24 RX ORDER — ASPIRIN 81 MG/1
81 TABLET, CHEWABLE ORAL DAILY
Status: DISCONTINUED | OUTPATIENT
Start: 2021-04-24 | End: 2021-04-25 | Stop reason: HOSPADM

## 2021-04-24 RX ORDER — NICOTINE 21 MG/24HR
1 PATCH, TRANSDERMAL 24 HOURS TRANSDERMAL
Status: DISCONTINUED | OUTPATIENT
Start: 2021-04-24 | End: 2021-04-25 | Stop reason: HOSPADM

## 2021-04-24 RX ADMIN — ATORVASTATIN CALCIUM 80 MG: 40 TABLET, FILM COATED ORAL at 20:09

## 2021-04-24 RX ADMIN — SODIUM CHLORIDE, PRESERVATIVE FREE 10 ML: 5 INJECTION INTRAVENOUS at 20:09

## 2021-04-24 RX ADMIN — NICOTINE 1 PATCH: 14 PATCH, EXTENDED RELEASE TRANSDERMAL at 12:18

## 2021-04-24 RX ADMIN — ASPIRIN 81 MG: 81 TABLET, CHEWABLE ORAL at 20:09

## 2021-04-24 RX ADMIN — SODIUM CHLORIDE, PRESERVATIVE FREE 10 ML: 5 INJECTION INTRAVENOUS at 08:03

## 2021-04-24 RX ADMIN — NICOTINE 1 PATCH: 14 PATCH, EXTENDED RELEASE TRANSDERMAL at 20:10

## 2021-04-24 NOTE — THERAPY EVALUATION
Patient Name: Cristian Perez  : 2000    MRN: [id number]                              Today's Date: 2021       Admit Date: 2021    Visit Dx:     ICD-10-CM ICD-9-CM   1. Impaired functional mobility, balance, gait, and endurance  Z74.09 V49.89     Patient Active Problem List   Diagnosis   • CVA (cerebral vascular accident), possible, status post TPA 2021    • Atrial flutter (CMS/HCC)   • Renal insufficiency, based upon outside hospital labs     No past medical history on file.  No past surgical history on file.  General Information     Row Name 21 1021          Physical Therapy Time and Intention    Document Type  evaluation  -     Mode of Treatment  physical therapy  -     Row Name 21 1021          General Information    Prior Level of Function  independent:;all household mobility;community mobility;gait;transfer;bed mobility;ADL's;driving;using stairs;work  -     Existing Precautions/Restrictions  other (see comments) s/p tpa  -     Barriers to Rehab  none identified  -     Row Name 21 1021          Living Environment    Lives With  significant other;parent(s)  -     Row Name 21 1021          Home Main Entrance    Number of Stairs, Main Entrance  three  -     Row Name 21 1021          Stairs Within Home, Primary    Stairs, Within Home, Primary  bedroo, on 2nd floor  -     Row Name 21 1021          Cognition    Orientation Status (Cognition)  oriented x 4  -SJ     Row Name 21 1021          Safety Issues, Functional Mobility    Safety Issues Affecting Function (Mobility)  insight into deficits/self-awareness  -     Impairments Affecting Function (Mobility)  motor planning;motor control;muscle tone abnormal;strength  -       User Key  (r) = Recorded By, (t) = Taken By, (c) = Cosigned By    Initials Name Provider Type    SJ Nory Tillman PT Physical Therapist        Mobility     Row Name 21 1156          Transfers    Comment  (Transfers)  pt able to complete without difficulty  -SJ     Row Name 04/24/21 1156          Sit-Stand Transfer    Sit-Stand [place] (Transfers)  set up  -SJ     Row Name 04/24/21 1156          Gait/Stairs (Locomotion)    [place] Level (Gait)  contact guard;verbal cues  -SJ     Distance in Feet (Gait)  160ft  -SJ     Deviations/Abnormal Patterns (Gait)  left sided deviations;gait speed decreased;base of support, wide  -SJ     Left Sided Gait Deviations  knee buckling, left side;weight shift ability decreased  -SJ     Comment (Gait/Stairs)  gait abnormality due to decreased motor control and impaired sensation (pins and needles) in LLE. Distance limited by weakness and fatigue.  -SJ       User Key  (r) = Recorded By, (t) = Taken By, (c) = Cosigned By    Initials Name Provider Type    Nory Childress PT Physical Therapist        Obj/Interventions     Row Name 04/24/21 1158          Range of Motion Comprehensive    General Range of Motion  bilateral lower extremity ROM WFL  -SJ     Row Name 04/24/21 1158          Strength Comprehensive (MMT)    General Manual Muscle Testing (MMT) Assessment  lower extremity strength deficits identified  -SJ     Comment, General Manual Muscle Testing (MMT) Assessment  RLE 5/5, LLE 4-/5  -SJ       User Key  (r) = Recorded By, (t) = Taken By, (c) = Cosigned By    Initials Name Provider Type    Nory Childress PT Physical Therapist        Goals/Plan     Row Name 04/24/21 1201          Gait Training Goal 1 (PT)    Activity/Assistive Device (Gait Training Goal 1, PT)  gait (walking locomotion);diminish gait deviation  -SJ     [place] Level (Gait Training Goal 1, PT)  independent  -SJ     Distance (Gait Training Goal 1, PT)  1000ft  -SJ     Time Frame (Gait Training Goal 1, PT)  long term goal (LTG);2 weeks  -SJ     Row Name 04/24/21 1201          Stairs Goal 1 (PT)    Activity/Assistive Device (Stairs Goal 1, PT)  ascending stairs;descending stairs  -SJ      [place] Level/Cues Needed (Stairs Goal 1, PT)  modified independence  -SJ     Number of Stairs (Stairs Goal 1, PT)  13  -SJ     Time Frame (Stairs Goal 1, PT)  long term goal (LTG);2 weeks  -[facility] Name 04/24/21 1201          Patient Education Goal (PT)    Activity (Patient Education Goal, PT)  HEP  -SJ     [place]/Cues/Accuracy (Memory Goal 2, PT)  demonstrates adequately;verbalizes understanding  -SJ     Time Frame (Patient Education Goal, PT)  long term goal (LTG);2 weeks  -       User Key  (r) = Recorded By, (t) = Taken By, (c) = Cosigned By    Initials Name Provider Type    SJ Nory Tillman, PT Physical Therapist        Clinical Impression     [facility] Name 04/24/21 1158          Pain    Additional Documentation  Pain Scale: Numbers Pre/Post-Treatment (Group)  -[facility] 04/24/21 1158          Pain Scale: Numbers Pre/Post-Treatment    Pretreatment Pain Rating  0/10 - no pain  -     Posttreatment Pain Rating  0/10 - no pain  -[facility] 04/24/21 1156          Plan of Care Review    Plan of Care Reviewed With  patient  -SJ     Outcome Summary  PT eval comnpleted. Pt presents with LLE weakness, decreased motor control, and impaired sensation. Pt amb 160ft with CGA pushing monitor, distance limited by LLE weakness and fatigue, with a few instances of L knee buckling. PT recommends d/c home with outpatient PT services.  -[facility] Name 04/24/21 7259          Therapy Assessment/Plan (PT)    Patient/Family Therapy Goals Statement (PT)  return to work  -SJ     Rehab Potential (PT)  good, to achieve stated therapy goals  -     Criteria for Skilled Interventions Met (PT)  yes;skilled treatment is necessary  -     Predicted Duration of Therapy Intervention (PT)  2wks  -     Row Name 04/24/21 1151          Vital Signs    Pre Systolic BP Rehab  129  -SJ     Pre Treatment Diastolic BP  87  -SJ     Post Systolic BP Rehab  152  -SJ     Post Treatment Diastolic BP  85  -SJ     Pretreatment  Heart Rate (beats/min)  77  -SJ     Posttreatment Heart Rate (beats/min)  75  -SJ     Pre SpO2 (%)  98  -SJ     O2 Delivery Pre Treatment  room air  -SJ     Post SpO2 (%)  96  -SJ     O2 Delivery Post Treatment  room air  -SJ     Intra Patient Position  Sitting  -SJ     Post Patient Position  Sitting  -SJ     Row Name 04/24/21 1158          Positioning and Restraints    Pre-Treatment Position  in bed  -SJ     Post Treatment Position  chair  -SJ     In Chair  notified nsg;reclined;call light within reach;encouraged to call for assist;exit alarm on;waffle cushion;legs elevated;heels elevated  -SJ       User Key  (r) = Recorded By, (t) = Taken By, (c) = Cosigned By    Initials Name Provider Type    Nory Childress PT Physical Therapist        Outcome Measures     Row Name 04/24/21 1202          How much help from another person do you currently need...    Turning from your back to your side while in flat bed without using bedrails?  4  -SJ     Moving from lying on back to sitting on the side of a flat bed without bedrails?  4  -SJ     Moving to and from a bed to a chair (including a wheelchair)?  4  -SJ     Standing up from a chair using your arms (e.g., wheelchair, bedside chair)?  4  -SJ     Climbing 3-5 steps with a railing?  4  -SJ     To walk in hospital room?  4  -SJ     AM-PAC 6 Clicks Score (PT)  24  -[facility] Name 04/24/21 1202          Modified [place] Scale    Pre-Stroke Modified [place] Scale  0 - No Symptoms at all.  -SJ     Modified [place] Scale  2 - Slight disability.  Unable to carry out all previous activities but able to look after own affairs without assistance.  -     Row Name 04/24/21 1202          Functional Assessment    Outcome Measure Options  AM-PAC 6 Clicks Basic Mobility (PT);Modified [place]  -       User Key  (r) = Recorded By, (t) = Taken By, (c) = Cosigned By    Initials Name Provider Type    Nory Childress PT Physical Therapist        Physical Therapy Education                  Title: PT OT SLP Therapies (In Progress)     Topic: Physical Therapy (In Progress)     Point: Mobility training (Done)     Learning Progress Summary           Patient SAMANTA Blandon VU, DU by  at 4/24/2021 1203                   Point: Home exercise program (Not Started)     Learner Progress:  Not documented in this visit.          Point: Body mechanics (Done)     Learning Progress Summary           Patient SAMANTA Blandon VUDU by  at 4/24/2021 1203                   Point: Precautions (Done)     Learning Progress Summary           Patient SAMANTA Blandon VUDU by  at 4/24/2021 1203                               User Key     Initials Effective Dates Name Provider Type Discipline     06/19/15 -  Nory Tillman PT Physical Therapist PT              PT Recommendation and Plan  Planned Therapy Interventions (PT): balance training, gait training, home exercise program, patient/family education, neuromuscular re-education, stair training, strengthening, transfer training  Plan of Care Reviewed With: patient  Outcome Summary: PT eval comnpleted. Pt presents with LLE weakness, decreased motor control, and impaired sensation. Pt amb 160ft with CGA pushing monitor, distance limited by LLE weakness and fatigue, with a few instances of L knee buckling. PT recommends d/c home with outpatient PT services.     Time Calculation:   PT Charges     Row Name 04/24/21 1204             Time Calculation    Start Time  1021  -SJ      PT Received On  04/24/21  -SJ      PT Goal Re-Cert Due Date  05/04/21  -         Timed Charges    15183 - Gait Training Minutes   15  -SJ         Untimed Charges    PT Eval/Re-eval Minutes  35  -SJ         Total Minutes    Timed Charges Total Minutes  15  -SJ      Untimed Charges Total Minutes  35  -SJ       Total Minutes  50  -SJ        User Key  (r) = Recorded By, (t) = Taken By, (c) = Cosigned By    Initials Name Provider Type     Nory Tillman PT Physical Therapist        Therapy Charges for Today      Code Description Service Date Service Provider Modifiers Qty    [id number]  GAIT TRAINING EA 15 MIN 4/24/2021 Nory Tillman, PT GP 1    16023003352 HC PT EVAL LOW COMPLEXITY 3 4/24/2021 Nory Tillman, PT GP 1          PT G-Codes  Outcome Measure Options: AM-PAC 6 Clicks Basic Mobility (PT), Modified [place]  AM-PAC 6 Clicks Score (PT): 24  AM-PAC 6 Clicks Score (OT): 24  Modified [place] Scale: 2 - Slight disability.  Unable to carry out all previous activities but able to look after own affairs without assistance.    Nory Tillman, PT  4/24/2021

## 2021-04-24 NOTE — PLAN OF CARE
Goal Outcome Evaluation:  Plan of Care Reviewed With: patient     Outcome Summary: PT karely comnpleted. Pt presents with LLE weakness, decreased motor control, and impaired sensation. Pt amb 160ft with CGA pushing monitor, distance limited by LLE weakness and fatigue, with a few instances of L knee buckling. PT recommends d/c home with outpatient PT services.

## 2021-04-24 NOTE — PROGRESS NOTES
Intensivist Note     4/24/2021  Hospital Day: 1  * No surgery found *  ICU Stays Timeline            Hospital Admission: 04/23/21 1813 - Current  ICU stays: 1      In Date/Time Event Department ICU Stay Duration     04/23/21 1813 Admission  [facility] 3E      04/23/21 1926 Transfer In  [facility] 2B ICU 13 hours 48 minutes             Mr. Cristian Perez, 20 y.o. male is followed for:    TIA versus possible CVA s/p TPA 4/23/2021 (CMS/[facility])       SUBJECTIVE     1 y.o. male  who presented to [facility] ED 4/23/2021 @ 1523 w/ acute onset  of R facial droop, L facial numbness, HA, & LUE/LLE weakness/parasthesias.  Was thought to be in atrial flutter as well which is a new finding (EKG however sent to [facility] apparently was sinus tachycardia).  NIHSS was 4.  He was transferred to our facility and was initially admitted to the floor as imaging did not reveal any acute findings (CTh, CTA, CTP all apparently negative).  MRI read by radiologist was read as negative except for some possible mild paranasal sinusitis.  ntensivist however was contacted by the CVA Navigator and informed that Neurology felt there was a focal lesion and patient would benefit from tPA.  He was given TPA at 1800 and subsequently transferred to the ICU.  Patient denied any history of medical issues but did mention that he had some chest discomfort 2 weeks PTA that was self-limiting.  Was not associated with exertion and resolve spontaneously.  There was no associated radiation of pain, nausea, or vomiting.    Interval history: Hemodynamically stable and there have been no arrhythmias noted.  MRI has been repeated and reviewed by Dr. Quintero and she agrees that there is no abnormality.  Patient still has right facial droop and left upper and lower extremity weakness (left-sided weakness is apparently improved).  Dr. Steinberg noted that there seems to be fluctuation of patient's right facial droop.  I note the patient presented with a mild leukocytosis  "which has resolved today, and procalcitonin obtained today is normal..         ROS: Per subjective, all other systems reviewed and were negative.    The patient's relevant PMH, PSH, FH, and SH were reviewed and updated in Epic as appropriate. Allergies and Medications reviewed.    OBJECTIVE     /85   Pulse 87   Temp 98.2 °F (36.8 °C) (Axillary)   Resp 18   Ht 182.9 cm (72\")   Wt 112 kg (246 lb 14.6 oz)   SpO2 99%   BMI 33.49 kg/m²           Flowsheet Rows      First Filed Value   Admission Height  182.9 cm (72\") Documented at 04/23/2021 1934   Admission Weight  112 kg (247 lb 2.2 oz) Documented at 04/23/2021 1934        Intake & Output (last day)       04/23 0701 - 04/24 0700 04/24 0701 - 04/25 0700    Urine (mL/kg/hr) 1800     Total Output 1800     Net -1800                 Exam:  General Exam:  Well-developed healthy looking white male sitting up in bed in NAD  HEENT: Still with right facial droop especially noticeable when talking.  Pupils equal and reactive. Nose and throat clear.  Neck:                          Supple, no JVD, thyromegaly, or adenopathy  Lungs: Clear to auscultation and percussion anteriorly and posteriorly.  Cardiovascular: Regular rate and rhythm without murmurs or gallops.  Abdomen: Soft nontender without organomegaly or masses.   and rectal: Deferred.  Extremities: No cyanosis clubbing edema.  Neurologic:                 4/5 left arm strength, 3/5 left leg strength.  Right facial droop.  Other extremities normal.  Speech normal.  Cranial nerves normal       Chest X-Ray 4/23/2021: Normal      Results from last 7 days   Lab Units 04/24/21  0343 04/23/21 1959   WBC 10*3/mm3 6.70 16.20*   HEMOGLOBIN g/dL 15.3 16.2   HEMATOCRIT % 45.0 46.9   PLATELETS 10*3/mm3 293 308     Results from last 7 days   Lab Units 04/24/21  0343 04/23/21  1959   SODIUM mmol/L 135* 135*   POTASSIUM mmol/L 4.3 3.8   CHLORIDE mmol/L 100 100   CO2 mmol/L 22.0 21.0*   BUN mg/dL 17 15   CREATININE mg/dL 1.22 " 1.16   GLUCOSE mg/dL 149* 142*   CALCIUM mg/dL 8.9 8.8     Results from last 7 days   Lab Units 04/24/21  0343   MAGNESIUM mg/dL 1.9   PHOSPHORUS mg/dL 4.8*     Results from last 7 days   Lab Units 04/23/21 1959   ALK PHOS U/L 108   BILIRUBIN mg/dL 0.5   ALT (SGPT) U/L 38   AST (SGOT) U/L 19       No results found for: SEDRATE  No results found for: BNP  No results found for: CKTOTAL, CKMB, CKMBINDEX, TROPONINI, TROPONINT  Lab Results   Component Value Date    TSH 0.932 04/23/2021     No results found for: LACTATE  No results found for: CORTISOL      I reviewed the patient's results, images and medication.    Assessment/Plan   ASSESSMENT        TIA versus possible CVA s/p TPA 4/23/2021 (CMS/[facility])      DISCUSSION: Situation unclear to me.  Right facial weakness and left-sided hemiplegia would be consistent with a brainstem event.  Nothing however is seen on CTH, CTA, CTP, and MRI x 2.  This is thus either a TIA or its factitious.  (In reading neurology's note she mentions fluctuating right facial droop suggesting the latter).  Situation made more difficult by the fact that [facility] but review of the EEG apparently just revealed it to be sinus (and has had no arrhythmias since admission).  In addition there was mention made of renal insufficiency based on outside records but BUN and creatinine are normal here    PLAN     1.  Follow-up CTH 24 hours after TPA  2.  Will defer to neurology as to whether they feel this was a real event such as a TIA or factitious.  With nothing on 2 MRIs but persistent neurologic findings that seems to make the latter more likely  3.  Observe until tomorrow but if neurology feels he can be discharged home on aspirin will do so and have him follow-up in our clinic  4.  Presume Home on high-dose statin because of elevated cholesterol and LDL  5.  Hypercoagulable panel has been ordered by neurology    Plan of care and goals reviewed with  multidisciplinary team at daily rounds.    I discussed the patient's findings and my recommendations with patient, nursing staff and consulting provider    Time spent Critical care 25 min (It does not include procedure time).    Electronically signed by Samuel Holland MD, 04/24/21, 9:14 AM EDT.   Pulmonary / Critical care medicine

## 2021-04-24 NOTE — PLAN OF CARE
Problem: Adult Inpatient Plan of Care  Goal: Plan of Care Review  Recent Flowsheet Documentation  Taken 4/24/2021 1136 by Kevin Ziegler OT  Progress: improving  Plan of Care Reviewed With: patient  Outcome Summary: Initial OT evaluation completed. Pt does not present w/ deficits warranting skilled OT intervention. Pt demonstrated mild weakness in LUE during MMT, however demonstrated fxl strength during ADL completion. Pt supervsion for bed mobility, transfers, and ADL completion (d/t line management). Pt provided w/ resistance band(s) and foam block along w/ LUE HEP, Pt demonstrated independence by performing 1/10reps. Recommend d/c to home .

## 2021-04-24 NOTE — THERAPY DISCHARGE NOTE
Acute Care - Speech Language Pathology Initial Evaluation/Discharge  [facility]   Cognitive-Communication Evaluation  & Clinical Swallow Evaluation     Patient Name: Cristian Perez  : 2000  MRN: [id number]  Today's Date: 2021               Admit Date: 2021     Visit Dx:    ICD-10-CM ICD-9-CM   1. Cerebrovascular accident (CVA), unspecified mechanism (CMS/HCC)  I63.9 434.91   2. Impaired functional mobility, balance, gait, and endurance  Z74.09 V49.89     Patient Active Problem List   Diagnosis   • CVA (cerebral vascular accident), possible, status post TPA 2021    • Atrial flutter (CMS/HCC)   • Renal insufficiency, based upon outside hospital labs     No past medical history on file.  No past surgical history on file.       SLP EVALUATION (last 72 hours)      SLP SLC Evaluation     Row Name 21 1115                   Communication Assessment/Intervention    Document Type  evaluation  -AC        Subjective Information  complains of;pain  -AC        Patient Observations  alert;cooperative  -AC        Patient/Family/Caregiver Comments/Observations  Father and friend present.  -AC        Patient Effort  good  -AC           General Information    Patient Profile Reviewed  yes  -AC        Pertinent History Of Current Problem  Adm w/ suspected CVA/TIA. S/p tPA. Imaging negative for CVA thus far.   -AC        Precautions/Limitations, Vision  WFL;for purposes of eval  -AC        Precautions/Limitations, Hearing  WFL;for purposes of eval  -AC        Prior Level of Function-Communication  WFL  -AC        Plans/Goals Discussed with  patient and family;agreed upon  -        Barriers to Rehab  none identified  -        Patient's Goals for Discharge  return to home;return to work;return to all previous roles/activities  -        Family Goals for Discharge  family did not state  -AC           Pain    Additional Documentation  Pain Scale: FACES Pre/Post-Treatment (Group)  -AC           Pain Scale:  FACES Pre/Post-Treatment    Pain: FACES Scale, Pretreatment  2-->hurts little bit  -AC        Posttreatment Pain Rating  2-->hurts little bit  -AC        Pain Location  head  -AC        Pre/Posttreatment Pain Comment  Reported that RN aware.  -AC           Comprehension Assessment/Intervention    Comprehension Assessment/Intervention  Auditory Comprehension;Reading Comprehension  -AC           Auditory Comprehension Assessment/Intervention    Auditory Comprehension (Communication)  WFL  -AC        Answers Questions (Communication)  WFL;complex;yes/no;wh questions  -AC        Able to Follow Commands (Communication)  WFL;multi-step  -AC        Narrative Discourse  WFL  -AC           Reading Comprehension Assessment/Intervention    Reading Comprehension (Communication)  WFL  -AC        Phrase Level  WFL  -AC           Expression Assessment/Intervention    Expression Assessment/Intervention  verbal expression;graphic expression  -AC           Verbal Expression Assessment/Intervention    Verbal Expression  WFL  -AC        Automatic Speech (Communication)  WFL;response to greeting  -AC        Repetition  WFL;words  -AC        Responsive Naming  WFL;simple  -AC        Confrontational Naming  WFL;high frequency  -AC        Conversational Discourse/Fluency  WFL  -AC           Graphic Expression Assessment/Intervention    Graphic Expression  WFL;dominant hand  -AC        Graphic Expression, Comment  Pt able to generate complex written sentence using 2 key words.  -AC           Oral Motor Structure and Function    Dentition Assessment  natural, present and adequate  -AC        Mucosal Quality  moist, healthy  -AC           Oral Musculature and Cranial Nerve Assessment    Oral Motor General Assessment  lingual impairment;oral labial or buccal impairment  -AC        Oral Labial or Buccal Impairment, Detail, Cranial Nerve VII (Facial):  reduced ROM;other (see comments) varying severity during OME  -AC        Lingual Impairment,  Detail. Cranial Nerves IX, XII (Glossopharyngeal and Hypoglossal)  other (see comments) lingual deviation upon protrusion  -AC           Motor Speech Assessment/Intervention    Motor Speech Function  WFL;unfamiliar listener;familiar listener  -        Conversational Speech (Communication)  WFL  -AC        Motor Speech, Comment  Pt 100% intelligible to unfamiliar listener. Pt denied noting deficits. Noted a few articulation differences on certain phonemes; however, father confirmed that speech has returned to baseline.  -AC           Cognitive Assessment Intervention- SLP    Cognitive Function (Cognition)  WFL  -        Orientation Status (Cognition)  WFL;person;time;place;situation  -AC        Memory (Cognitive)  WFL;short-term;immediate;delayed;unrelated  -        Attention (Cognitive)  WFL;sustained  -AC        Thought Organization (Cognitive)  WFL;abstract convergent  -AC        Reasoning (Cognitive)  WFL;deductive  -AC        Problem Solving (Cognitive)  WFL;temporal  -AC        Functional Math (Cognitive)  WFL;money calculation;word problems  -        Executive Function (Cognition)  WF  -        Pragmatics (Communication)  WFL  -           SLP Clinical Impressions    SLP Diagnosis  Functional cognitive-communication skills.  -AC        SLC Criteria for Skilled Therapy Interventions Met  no problems identified which require skilled intervention;baseline status  -           Recommendations    Anticipated Discharge Disposition (SLP)  home  -          User Key  (r) = Recorded By, (t) = Taken By, (c) = Cosigned By    Initials Name Effective Dates    AC Fiona Patel MS CCC-SLP 07/27/17 -            EDUCATION  The patient has been educated in the following areas:   Cognitive Impairment Communication Impairment.      SLP Recommendation and Plan  SLP Diagnosis: Functional cognitive-communication skills.     Swallow Criteria for Skilled Therapeutic Interventions Met: no problems identified which  require skilled intervention  SLC Criteria for Skilled Therapy Interventions Met: no problems identified which require skilled intervention, baseline status  Anticipated Discharge Disposition (SLP): home      Plan of Care Reviewed With: patient, father, friend  Progress: no change        Time Calculation:   Time Calculation- SLP     Row Name 21 1305             Time Calculation- SLP    SLP Start Time  1115  -AC      SLP Received On  21  -AC         Untimed Charges    SLP Eval/Re-eval   ST Eval Oral Pharyng Swallow - 73747;ST Eval Speech and Production w/ Language - 45809  -AC      98196-QC Eval Speech and Production w/ Language Minutes  30  -AC      44518-NF Eval Oral Pharyng Swallow Minutes  30  -AC         Total Minutes    Untimed Charges Total Minutes  60  -AC       Total Minutes  60  -AC        User Key  (r) = Recorded By, (t) = Taken By, (c) = Cosigned By    Initials Name Provider Type    AC Fiona Patel MS CCC-SLP Speech and Language Pathologist          Therapy Charges for Today     Code Description Service Date Service Provider Modifiers Qty    58209669204 HC ST EVAL SPEECH AND PROD W LANG  2 2021 Fiona Patel MS CCC-SLP GN 1    25033484924 HC ST EVAL ORAL PHARYNG SWALLOW 2 2021 Fiona Patel MS CCC-SLP GN 1                   SLP Discharge Summary  Anticipated Discharge Disposition (SLP): home    Fiona Patel MS CCC-SLP  2021 and Acute Care - Speech Language Pathology   Swallow Initial Evaluation/Discharge [facility]     Patient Name: Cristian Perez  : 2000  MRN: [id number]  Today's Date: 2021               Admit Date: 2021    Visit Dx:    ICD-10-CM ICD-9-CM   1. Cerebrovascular accident (CVA), unspecified mechanism (CMS/HCC)  I63.9 434.91   2. Impaired functional mobility, balance, gait, and endurance  Z74.09 V49.89     Patient Active Problem List   Diagnosis   • CVA (cerebral vascular accident), possible, status post TPA 2021    • Atrial flutter  (CMS/[facility], located [facility])   • Renal insufficiency, based upon outside hospital labs     No past medical history on file.  No past surgical history on file.       SWALLOW EVALUATION (last 72 hours)      SLP Adult Swallow Evaluation     Row Name 04/24/21 5650                   Rehab Evaluation    Document Type  evaluation  -AC        Patient Effort  good  -AC           General Information    Patient Profile Reviewed  yes  -AC        Current Method of Nutrition  NPO  -AC        Prior Level of Function-Swallowing  no diet consistency restrictions;safe, efficient swallowing in all situations  -AC        Plans/Goals Discussed with  patient and family;agreed upon  -        Barriers to Rehab  none identified  -AC        Patient's Goals for Discharge  return to PO diet  -AC        Family Goals for Discharge  family did not state  -AC           Pain Scale: FACES Pre/Post-Treatment    Pain: FACES Scale, Pretreatment  2-->hurts little bit  -AC        Posttreatment Pain Rating  2-->hurts little bit  -AC        Pain Location  head  -AC        Pre/Posttreatment Pain Comment  Reported RN aware.  -AC           Oral Motor Structure and Function    Secretion Management  WNL/WFL  -AC        Volitional Swallow  WFL  -AC        Volitional Cough  WFL  -AC           General Eating/Swallowing Observations    Respiratory Support Currently in Use  room air  -AC        Eating/Swallowing Skills  self-fed;appropriate self-feeding skills observed  -AC        Positioning During Eating  upright 90 degree;upright in chair  -AC        Utensils Used  spoon;cup;straw  -AC        Consistencies Trialed  ice chips;thin liquids;pureed;regular textures  -AC           Clinical Swallow Eval    Oral Prep Phase  WFL  -AC        Oral Transit  WFL  -AC        Oral Residue  WFL  -AC        Pharyngeal Phase  no overt signs/symptoms of pharyngeal impairment  -AC        Clinical Swallow Evaluation Summary  Oral phase seemingly WFL for consistencies trialed. No overt clinical s/sxs  aspiration appreciated w/ any consistency, even when pt pushed w/ 3oz H2O test.  -AC           Clinical Impression    SLP Swallowing Diagnosis  swallow WFL  -        Swallow Criteria for Skilled Therapeutic Interventions Met  no problems identified which require skilled intervention  -           Recommendations    SLP Diet Recommendation  regular textures;thin liquids  -        Recommended Precautions and Strategies  upright posture during/after eating;general aspiration precautions  -        Oral Care Recommendations  Oral Care BID/PRN  -        SLP Rec. for Method of Medication Administration  meds whole;with thin liquids;as tolerated  -        Monitor for Signs of Aspiration  yes;notify SLP if any concerns  -        Anticipated Discharge Disposition (SLP)  home  -          User Key  (r) = Recorded By, (t) = Taken By, (c) = Cosigned By    Initials Name Effective Dates     Fiona Patel, MS CCC-SLP 07/27/17 -           EDUCATION  The patient has been educated in the following areas:   Dysphagia (Swallowing Impairment).    SLP Recommendation and Plan  SLP Swallowing Diagnosis: swallow WFL  SLP Diet Recommendation: regular textures, thin liquids     Monitor for Signs of Aspiration: yes, notify SLP if any concerns     Swallow Criteria for Skilled Therapeutic Interventions Met: no problems identified which require skilled intervention  Anticipated Discharge Disposition (SLP): home      Anticipated Discharge Disposition (SLP): home     Plan of Care Reviewed With: patient, father, friend  Progress: no change         Time Calculation:   Time Calculation- SLP     Row Name 04/24/21 1305             Time Calculation- SLP    SLP Start Time  1115  -      SLP Received On  04/24/21  -         Untimed Charges    SLP Eval/Re-eval   ST Eval Oral Pharyng Swallow - 55478;ST Eval Speech and Production w/ Language - 83591  -      23987-GO Eval Speech and Production w/ Language Minutes  30  -      46334-SD  Eval Oral Pharyng Swallow Minutes  30  -AC         Total Minutes    Untimed Charges Total Minutes  60  -AC       Total Minutes  60  -AC        User Key  (r) = Recorded By, (t) = Taken By, (c) = Cosigned By    Initials Name Provider Type    Fiona Egan MS CCC-SLP Speech and Language Pathologist          Therapy Charges for Today     Code Description Service Date Service Provider Modifiers Qty    69171828408 HC ST EVAL SPEECH AND PROD W LANG  2 4/24/2021 Fiona Patel MS CCC-SLP GN 1    [id number] HC ST EVAL ORAL PHARYNG SWALLOW 2 4/24/2021 Fiona Patel MS CCC-SLP GN 1           SLP Discharge Summary  Anticipated Discharge Disposition (SLP): home     Patient was not wearing a face mask and did exhibit coughing during this therapy encounter.  Procedure performed was aerosolizing, involved close contact (within 6 feet for at least 15 minutes or longer), and did not involve contact with infectious secretions or specimens.  Therapist used appropriate personal protective equipment including gloves, standard procedure mask and eye protection.  Appropriate PPE was worn during the entire therapy session.  Hand hygiene was completed before and after therapy session.       MS MARGARET Norris  4/24/2021

## 2021-04-24 NOTE — CASE MANAGEMENT/SOCIAL WORK
Discharge Planning Assessment  [facility]     Patient Name: Cristian Perez  MRN: [id number]  Today's Date: 4/24/2021    Admit Date: 4/23/2021    Discharge Needs Assessment     Row Name 04/24/21 1501       Living Environment    Lives With  grandparent(s)    Name(s) of Who Lives With Patient  Judy Grijalva grandmother    Current Living Arrangements  home/apartment/condo    Primary Care Provided by  self    Provides Primary Care For  no one    Family Caregiver if Needed  grandparent(s);parent(s)    Family Caregiver Names  Grandmother Judy, dad Ton Perez    Quality of Family Relationships  supportive;involved;helpful    Able to Return to Prior Arrangements  yes       Transition Planning    Patient/Family Anticipates Transition to  home with family    Patient/Family Anticipated Services at Transition      Transportation Anticipated  family or friend will provide       Discharge Needs Assessment    Readmission Within the Last 30 Days  no previous admission in last 30 days    Equipment Currently Used at Home  none    Outpatient/Agency/Support Group Needs  outpatient therapy    Discharge Facility/Level of Care Needs  outpatient therapy    Discharge Coordination/Progress  PCP Edilma Shipley  Gise[facility] Stre[facility]        Discharge Plan     Row Name 04/24/21 150       Plan    Plan  Home    Patient/Family in Agreement with Plan  yes    Plan Comments  Spoke with patient's grandmother and patient over the phone. Patient lives in [place] [facility] [place] with his grandparents.  He mom passed away unexpectedly last year. He is independent, no current DME or HH.  He does not really want to go to OP PT, but state he would if necessary. If needed, OP PT order can be placed for patient to take with him at discharge. Patient has good family support with grandparents and his dad.  No other dc needs at this time, family to transport.    Final Discharge Disposition Code  01 - home or self-care        Continued Care and  Services - Admitted Since 4/23/2021    Coordination has not been started for this encounter.         Demographic Summary     Row Name 04/24/21 1504       General Information    Admission Type  inpatient    Arrived From  hospital    Referral Source  admission list    Reason for Consult  discharge planning    Preferred Language  English     Used During This Interaction  no        Functional Status     Row Name 04/24/21 1504       Functional Status    Usual Activity Tolerance  good    Current Activity Tolerance  good       Functional Status, IADL    Medications  independent    Meal Preparation  independent    Housekeeping  independent    Laundry  independent    Shopping  independent       Employment/    Employment Status  employed full-time    Employment/ Comments  [employer]        Psychosocial    No documentation.       Abuse/Neglect    No documentation.       Legal    No documentation.       Substance Abuse    No documentation.       Patient Forms    No documentation.           Padmini Villagomez RN

## 2021-04-24 NOTE — PLAN OF CARE
Goal Outcome Evaluation:      NIHSS of 3 at handoff with an hourly mNIHSS with vitals signs consistent at 1 due to left-sided sensory deficits. Right sided facial droop at rest. Patient A/Ox4; PERRLA. Hourly mNIHSS had no change throughout shift.    HR NSR in the 70's; 's-150's; RR 18-20 SaO2 >95% on RA;    PT and OT  worked with patient who was able to ambulate in hernandez and up to chair. Ambulates to bathroom with one assist.     Passed swallow eval and regular cardiac diet ordered. Ate 50% lunch.     Stroke education completed.    Awaiting for MRI scan at 1800.

## 2021-04-24 NOTE — THERAPY DISCHARGE NOTE
Acute Care - Occupational Therapy Discharge  [facility]    Patient Name: Cristian Perez  : 2000    MRN: [id number]                              Today's Date: 2021       Admit Date: 2021    Visit Dx: No diagnosis found.  Patient Active Problem List   Diagnosis   • CVA (cerebral vascular accident), possible, status post TPA 2021    • Atrial flutter (CMS/HCC)   • Renal insufficiency, based upon outside hospital labs     No past medical history on file.  No past surgical history on file.  General Information     Row Name 21 1128          OT Time and Intention    Document Type  discharge evaluation/summary  -CS     Mode of Treatment  occupational therapy  -CS     Row Name 21 1128          General Information    Patient Profile Reviewed  yes  -CS     Prior Level of Function  independent:;all household mobility;community mobility;bed mobility;ADL's;driving;work  -CS     Existing Precautions/Restrictions  other (see comments);no known precautions/restrictions  -CS     Barriers to Rehab  none identified  -CS     Row Name 21 1128          Living Environment    Lives With  parent(s);other (see comments) often stays w/ girlfriend  -CS     Row Name 21 1128          Home Main Entrance    Number of Stairs, Main Entrance  three  -CS     Stair Railings, Main Entrance  railings safe and in good condition  -CS     Row Name 21 1128          Stairs Within Home, Primary    Stairs, Within Home, Primary  2 level home w/ flight of stairs to Pt's bed/bath  -CS     Number of Stairs, Within Home, Primary  other (see comments)  -CS     Row Name 21 1128          Cognition    Orientation Status (Cognition)  oriented x 4  -CS     Row Name 21 1128          Safety Issues, Functional Mobility    Impairments Affecting Function (Mobility)  strength  -CS       User Key  (r) = Recorded By, (t) = Taken By, (c) = Cosigned By    Initials Name Provider Type    CS Kevin Zigeler, OT  Occupational Therapist        Mobility/ADL's     Row Name 04/24/21 1129          Bed Mobility    Bed Mobility  rolling left;sidelying-sit  -CS     Rolling Left [place] (Bed Mobility)  supervision  -CS     Sidelying-Sit [place] (Bed Mobility)  supervision  -CS     Comment (Bed Mobility)  Supervision d/t line management  -     Row Name 04/24/21 1129          Transfers    Transfers  sit-stand transfer  -     Comment (Transfers)  no AD or LOB during transfers  -     Bed-Chair [place] (Transfers)  supervision  -     Row Name 04/24/21 1129          Functional Mobility    Functional Mobility- Comment  defer to PT  -     Row Name 04/24/21 1129          Activities of Daily Living    BADL Assessment/Intervention  lower body dressing;grooming  -     Row Name 04/24/21 1129          Lower Body Dressing Assessment/Training    [place] Level (Lower Body Dressing)  don;socks;pants/bottoms;supervision  -CS     Position (Lower Body Dressing)  edge of bed sitting  -CS     Comment (Lower Body Dressing)  Pt demonstrated fxl BUE strength/coordination along w/ dynamic sit-stand balance for LB ADL completion  -     Row Name 04/24/21 1129          Grooming Assessment/Training    [place] Level (Grooming)  wash face, hands;independent  -CS     Position (Grooming)  supported sitting  -CS       User Key  (r) = Recorded By, (t) = Taken By, (c) = Cosigned By    Initials Name Provider Type     Kevin Ziegler OT Occupational Therapist        Obj/Interventions     Row Name 04/24/21 1133          Sensory Assessment (Somatosensory)    Sensory Assessment (Somatosensory)  UE sensation intact;other (see comments) Pt correctly identified all Lt touch stimuli bilaterally w/ vision occluded  -     Row Name 04/24/21 1133          Vision Assessment/Intervention    Visual Impairment/Limitations  WFL  -     Row Name 04/24/21 1133          Range of Motion Comprehensive    General Range of Motion  bilateral upper  extremity ROM WFL  -CS     Comment, General Range of Motion  RUE (dominant) grossly 5/5, LUE grossly 4/5  -     Row Name 04/24/21 1133          Shoulder (Therapeutic Exercise)    Shoulder (Therapeutic Exercise)  strengthening exercise  -     Shoulder Strengthening (Therapeutic Exercise)  left;flexion;extension;scapular stabilization;horizontal aBduction/aDduction;red;resistance band;10 repetitions;sitting  -     Row Name 04/24/21 1133          Elbow/Forearm (Therapeutic Exercise)    Elbow/Forearm (Therapeutic Exercise)  strengthening exercise  -     Elbow/Forearm Strengthening (Therapeutic Exercise)  left;flexion;extension;sitting;resistance band;red;10 repetitions  -     Row Name 04/24/21 1133          Hand (Therapeutic Exercise)    Hand (Therapeutic Exercise)  strengthening exercise  -     Hand Strengthening (Therapeutic Exercise)  left;finger flexion;intrinsic strengthening;10 repetitions;squeeze ball/egg;other (see comments) blue  -     Row Name 04/24/21 1133          Motor Skills    Motor Skills  coordination;functional endurance  -     Coordination  finger to nose;WFL  -     Functional Endurance  No dizziness of SOA during ADL completion  -     Row Name 04/24/21 1133          Balance    Balance Assessment  sitting static balance;sitting dynamic balance;standing static balance;standing dynamic balance  -     Static Sitting Balance  WFL;unsupported;sitting, edge of bed  -     Dynamic Sitting Balance  WFL;unsupported;sitting, edge of bed  -CS     Static Standing Balance  WFL;standing;unsupported  -     Dynamic Standing Balance  WFL;unsupported;standing  -     Comment, Balance  Pt maintained balance during ADL completion  -     Row Name 04/24/21 1133          Therapeutic Exercise    Therapeutic Exercise  shoulder;elbow/forearm;hand  -       User Key  (r) = Recorded By, (t) = Taken By, (c) = Cosigned By    Initials Name Provider Type    CS Kevin Ziegler, OT Occupational Therapist         Goals/Plan    No documentation.       Clinical Impression     Row Name 04/24/21 1136          Pain Assessment    Additional Documentation  Pain Scale: FACES Pre/Post-Treatment (Group)  -[facility] Name 04/24/21 1136          Pain Scale: FACES Pre/Post-Treatment    Pain: FACES Scale, Pretreatment  0-->no hurt  -CS     Posttreatment Pain Rating  0-->no hurt  -CS     Pre/Posttreatment Pain Comment  tolerated eval  -CS     Row Name 04/24/21 1136          Plan of Care Review    Plan of Care Reviewed With  patient  -CS     Progress  improving  -CS     Outcome Summary  Initial OT evaluation completed. Pt does not present w/ deficits warranting skilled OT intervention. Pt demonstrated mild weakness in LUE during MMT, however demonstrated fxl strength during ADL completion. Pt supervsion for bed mobility, transfers, and ADL completion (d/t line management). Pt provided w/ resistance band(s) and foam block along w/ LUE HEP, Pt demonstrated independence by performing 1/10reps. Recommend d/c to home .  -[facility] Name 04/24/21 1136          Therapy Assessment/Plan (OT)    Criteria for Skilled Therapeutic Interventions Met (OT)  no;no problems identified which require skilled intervention  -CS     Row Name 04/24/21 1136          Therapy Plan Review/Discharge Plan (OT)    Anticipated Discharge Disposition (OT)  home  -[facility] Name 04/24/21 1136          Vital Signs    Pre Systolic BP Rehab  129 RN and neuro cleared for eval VSS on RA  -CS     Pre Treatment Diastolic BP  87  -CS     Post Systolic BP Rehab  152  -CS     Post Treatment Diastolic BP  85  -CS     Posttreatment Heart Rate (beats/min)  72  -CS     O2 Delivery Pre Treatment  room air  -CS     O2 Delivery Intra Treatment  room air  -CS     Post SpO2 (%)  97  -CS     O2 Delivery Post Treatment  room air  -CS     Pre Patient Position  Supine  -CS     Intra Patient Position  Standing  -CS     Post Patient Position  Sitting  -CS     Row Name 04/24/21 1133           Positioning and Restraints    Pre-Treatment Position  in bed  -CS     Post Treatment Position  chair  -CS     In Chair  notified nsg;reclined;sitting;call light within reach;encouraged to call for assist;exit alarm on;waffle cushion;legs elevated  -CS       User Key  (r) = Recorded By, (t) = Taken By, (c) = Cosigned By    Initials Name Provider Type    Kevin Castillo OT Occupational Therapist        Outcome Measures     Row Name 04/24/21 1140          How much help from another is currently needed...    Putting on and taking off regular lower body clothing?  4  -CS     Bathing (including washing, rinsing, and drying)  4  -CS     Toileting (which includes using toilet bed pan or urinal)  4  -CS     Putting on and taking off regular upper body clothing  4  -CS     Taking care of personal grooming (such as brushing teeth)  4  -CS     Eating meals  4  -CS     AM-PAC 6 Clicks Score (OT)  24  -CS     Row Name 04/24/21 1140          Functional Assessment    Outcome Measure Options  AM-PAC 6 Clicks Daily Activity (OT)  -CS       User Key  (r) = Recorded By, (t) = Taken By, (c) = Cosigned By    Initials Name Provider Type    Kevin Castillo OT Occupational Therapist        Occupational Therapy Education                 Title: PT OT SLP Therapies (In Progress)     Topic: Occupational Therapy (In Progress)     Point: ADL training (Not Started)     Description:   Instruct learner(s) on proper safety adaptation and remediation techniques during self care or transfers.   Instruct in proper use of assistive devices.              Learner Progress:  Not documented in this visit.          Point: Home exercise program (Done)     Description:   Instruct learner(s) on appropriate technique for monitoring, assisting and/or progressing therapeutic exercises/activities.              Learning Progress Summary           Patient Acceptance, E,D, VU,DU by  at 4/24/2021 1140    Comment: OT role, LUE HEP w/ res band, safety  awareness                   Point: Precautions (Done)     Description:   Instruct learner(s) on prescribed precautions during self-care and functional transfers.              Learning Progress Summary           Patient Acceptance, E,D, VU,DU by ROSSANA at 4/24/2021 1140    Comment: OT role, LUE HEP w/ res band, safety awareness                   Point: Body mechanics (Not Started)     Description:   Instruct learner(s) on proper positioning and spine alignment during self-care, functional mobility activities and/or exercises.              Learner Progress:  Not documented in this visit.                      User Key     Initials Effective Dates Name Provider Type Discipline    ROSSANA 10/21/20 -  Kevin Ziegler OT Occupational Therapist OT              OT Recommendation and Plan  Retired Outcome Summary/Treatment Plan (OT)  Anticipated Discharge Disposition (OT): home  Plan of Care Review  Plan of Care Reviewed With: patient  Progress: improving  Outcome Summary: Initial OT evaluation completed. Pt does not present w/ deficits warranting skilled OT intervention. Pt demonstrated mild weakness in LUE during MMT, however demonstrated fxl strength during ADL completion. Pt supervsion for bed mobility, transfers, and ADL completion (d/t line management). Pt provided w/ resistance band(s) and foam block along w/ LUE HEP, Pt demonstrated independence by performing 1/10reps. Recommend d/c to home .  Plan of Care Reviewed With: patient  Outcome Summary: Initial OT evaluation completed. Pt does not present w/ deficits warranting skilled OT intervention. Pt demonstrated mild weakness in LUE during MMT, however demonstrated fxl strength during ADL completion. Pt supervsion for bed mobility, transfers, and ADL completion (d/t line management). Pt provided w/ resistance band(s) and foam block along w/ LUE HEP, Pt demonstrated independence by performing 1/10reps. Recommend d/c to home .     Time Calculation:   Time Calculation- OT      Row Name 04/24/21 1141             Time Calculation- OT    OT Start Time  1015  -CS      OT Received On  04/24/21  -CS         Timed Charges    65769 - OT Therapeutic Exercise Minutes  8  -CS         Untimed Charges    OT Eval/Re-eval Minutes  34  -CS         Total Minutes    Timed Charges Total Minutes  8  -CS      Untimed Charges Total Minutes  34  -CS       Total Minutes  42  -CS        User Key  (r) = Recorded By, (t) = Taken By, (c) = Cosigned By    Initials Name Provider Type    CS Kevin Ziegler OT Occupational Therapist        Therapy Charges for Today     Code Description Service Date Service Provider Modifiers Qty    79376811643  OT THER PROC EA 15 MIN 4/24/2021 Kevin Ziegler OT GO 1    [id number] HC OT EVAL LOW COMPLEXITY 3 4/24/2021 Kevin Ziegler OT GO 1               Kevin Ziegler OT  4/24/2021

## 2021-04-24 NOTE — PLAN OF CARE
Goal Outcome Evaluation:  Plan of Care Reviewed With: patient, father, friend  Progress: no change   SLP evaluation completed. Will sign-off as cognitive-communication skills WFL and no overt clinical s/sxs oropharyngeal dysphagia. Please see note for further details and recommendations.

## 2021-04-24 NOTE — CONSULTS
Subjective:    CC: Cristian Perez is seen today in consultation at the request of Irma Pimentel MD for possible stroke    HPI:  20-year-old male with no significant past medical history presents with strokelike symptoms.  As per patient he was driving yesterday afternoon when he started having numbness and weakness of his left arm and leg as well as right facial drooping.  He was brought to [facility] where he had a CT scan of the head that was unremarkable.  There was some question of seeing atrial flutter on EKG there however our  intensivist reviewed the EKG and it looks like sinus tachycardia.  After that he was given IV TPA and subsequently transferred here.  He had a repeat CT and CT perfusion here that were both unremarkable.  CT angiogram of the head and neck also did not show any intra or extracranial stenosis.  MRI brain that I personally reviewed did not show any ischemic changes or acute intracranial abnormalities.  Echocardiogram is still pending.  His blood glucose was slightly elevated at 142 and white count was 16.2.  U tox was negative.  He has been started on Lipitor 80 mg.  His lipid panel was as follows-, TG 58, , HDL 60.  A1c is 4.8.    The following portions of the patient's history were reviewed today and updated as of 04/23/2021  : allergies, current medications, past family history, past medical history, past social history, past surgical history and problem list  These document will be scanned to patient's chart.      Current Facility-Administered Medications:   •  acetaminophen (TYLENOL) suppository 650 mg, 650 mg, Rectal, Q4H PRN, Kane Arriaga, APRN  •  alteplase (ACTIVASE) 81 mg in sterile water (preservative free) 81 mL (1 mg/mL) infusion, 81 mg, Intravenous, Once, Nory Major MD, Stopped at 04/23/21 2012  •  aspirin tablet 325 mg, 325 mg, Oral, Daily **OR** aspirin suppository 300 mg, 300 mg, Rectal, Daily, Nory Major MD  •   "atorvastatin (LIPITOR) tablet 80 mg, 80 mg, Oral, Nightly, Nory Major MD  •  niCARdipine (CARDENE) 20 mg in 200 mL NS infusion, 5-15 mg/hr, Intravenous, Titrated, Nory Major MD  •  sodium chloride 0.9 % flush 10 mL, 10 mL, Intravenous, Q12H, Nory Major MD, 10 mL at 04/24/21 0803  •  sodium chloride 0.9 % flush 10 mL, 10 mL, Intravenous, PRN, Nory Major MD   No past medical history on file.   No past surgical history on file.   No family history on file.   Social History     Socioeconomic History   • Marital status: Unknown     Spouse name: Not on file   • Number of children: Not on file   • Years of education: Not on file   • Highest education level: Not on file   Tobacco Use   • Smoking status: Never Smoker   • Smokeless tobacco: Never Used     Review of Systems   Neurological: Positive for facial asymmetry, weakness and numbness.   All other systems reviewed and are negative.      Objective:    /92   Pulse 86   Temp 98.2 °F (36.8 °C) (Oral)   Resp 18   Ht 182.9 cm (72\")   Wt 112 kg (246 lb 14.6 oz)   SpO2 99%   BMI 33.49 kg/m²     Neurology Exam:    General apperance: Lying in bed in no acute distress    Mental status: Alert, awake and oriented to time place and person.    Recent and Remote memory: Intact.    Attention span and Concentration: Normal.     Language and Speech: Intact- No dysarthria.    Fluency, Naming , Repitition and Comprehension:  Intact    Cranial Nerves:   CN II: Visual fields are full. Intact. Fundi - Normal, No papillederma, Pupils - KRISTOPHER  CN III, IV and VI: Extraocular movements are intact. Normal saccades.   CN V: Facial sensation is intact.   CN VII: Muscles of facial expression reveal fluctuating right facial droop   CN VIII: Hearing is intact. Whispered voice intact.   CN IX and X: Palate elevates symmetrically. Intact  CN XI: Shoulder shrug is intact.   CN XII: Tongue is midline without evidence of atrophy or " fasciculation.     Ophthalmoscopic exam of optic disc-normal    Motor:  Right UE muscle strength 5/5. Normal tone.     Left UE muscle strength 4/5. Normal tone.      Right LE muscle strength5/5. Normal tone.     Left LE muscle strength 4/5. Normal tone.      Sensory: Reduced to light touch in left upper and lower extremity    DTRs: 2+ bilaterally in upper and lower extremities.    Babinski: Negative bilaterally.    Co-ordination: Normal finger-to-nose, heel to shin B/L.    Rhomberg: Negative.    Gait: Deferred    Cardiovascular: Regular rate and rhythm without murmur, gallop or rub.    Assessment and Plan:  20-year-old male who presented with right facial droop and left sided weakness/numbness.  MRI brain negative for any acute stroke    1.  TIA   Patient could have had a TIA however embellishment of symptoms with fluctuating right facial droop today (not noted by Dr. Medina yesterday)  -Continue  Lipitor 80 mg for goal LDL of less than 100.  His LDL was 159  -Start aspirin 81 mg after 24-hour post IV TPA CT   -Echocardiogram pending  -I have ordered hypercoagulable panel  -PT/OT       I spent over 45 minutes with the patient face to face out of which over 50% (30 minutes) was spent in management, instructions and education.     Melissa Quintero MD

## 2021-04-24 NOTE — PLAN OF CARE
Goal Outcome Evaluation:        Outcome Summary: Pt's NIH has improved througout the shift.  Able to hold BUE and BLL up without drifts, still having numbness on left side of body.  Facial droop has improved.  UOP adequate.  VSS.  Will continue to monitor.

## 2021-04-25 VITALS
HEART RATE: 65 BPM | OXYGEN SATURATION: 99 % | SYSTOLIC BLOOD PRESSURE: 142 MMHG | RESPIRATION RATE: 14 BRPM | HEIGHT: 72 IN | WEIGHT: 244.93 LBS | DIASTOLIC BLOOD PRESSURE: 81 MMHG | BODY MASS INDEX: 33.18 KG/M2 | TEMPERATURE: 98.2 F

## 2021-04-25 PROBLEM — R29.90 NEUROLOGICAL SYMPTOMS: Status: ACTIVE | Noted: 2021-04-25

## 2021-04-25 LAB
ANION GAP SERPL CALCULATED.3IONS-SCNC: 9 MMOL/L (ref 5–15)
BASOPHILS # BLD AUTO: 0.04 10*3/MM3 (ref 0–0.2)
BASOPHILS NFR BLD AUTO: 0.5 % (ref 0–1.5)
BUN SERPL-MCNC: 25 MG/DL (ref 6–20)
BUN/CREAT SERPL: 22.1 (ref 7–25)
CALCIUM SPEC-SCNC: 9.1 MG/DL (ref 8.6–10.5)
CHLORIDE SERPL-SCNC: 104 MMOL/L (ref 98–107)
CO2 SERPL-SCNC: 25 MMOL/L (ref 22–29)
CREAT SERPL-MCNC: 1.13 MG/DL (ref 0.76–1.27)
DEPRECATED RDW RBC AUTO: 41 FL (ref 37–54)
EOSINOPHIL # BLD AUTO: 0.1 10*3/MM3 (ref 0–0.4)
EOSINOPHIL NFR BLD AUTO: 1.3 % (ref 0.3–6.2)
ERYTHROCYTE [DISTWIDTH] IN BLOOD BY AUTOMATED COUNT: 12.5 % (ref 12.3–15.4)
GFR SERPL CREATININE-BSD FRML MDRD: 81 ML/MIN/1.73
GLUCOSE SERPL-MCNC: 97 MG/DL (ref 65–99)
HCT VFR BLD AUTO: 44.4 % (ref 37.5–51)
HGB BLD-MCNC: 14.6 G/DL (ref 13–17.7)
IMM GRANULOCYTES # BLD AUTO: 0.03 10*3/MM3 (ref 0–0.05)
IMM GRANULOCYTES NFR BLD AUTO: 0.4 % (ref 0–0.5)
LYMPHOCYTES # BLD AUTO: 2.34 10*3/MM3 (ref 0.7–3.1)
LYMPHOCYTES NFR BLD AUTO: 31.2 % (ref 19.6–45.3)
MCH RBC QN AUTO: 29.8 PG (ref 26.6–33)
MCHC RBC AUTO-ENTMCNC: 32.9 G/DL (ref 31.5–35.7)
MCV RBC AUTO: 90.6 FL (ref 79–97)
MONOCYTES # BLD AUTO: 0.61 10*3/MM3 (ref 0.1–0.9)
MONOCYTES NFR BLD AUTO: 8.1 % (ref 5–12)
NEUTROPHILS NFR BLD AUTO: 4.38 10*3/MM3 (ref 1.7–7)
NEUTROPHILS NFR BLD AUTO: 58.5 % (ref 42.7–76)
NRBC BLD AUTO-RTO: 0 /100 WBC (ref 0–0.2)
PLATELET # BLD AUTO: 232 10*3/MM3 (ref 140–450)
PMV BLD AUTO: 8.8 FL (ref 6–12)
POTASSIUM SERPL-SCNC: 4.1 MMOL/L (ref 3.5–5.2)
RBC # BLD AUTO: 4.9 10*6/MM3 (ref 4.14–5.8)
SODIUM SERPL-SCNC: 138 MMOL/L (ref 136–145)
WBC # BLD AUTO: 7.5 10*3/MM3 (ref 3.4–10.8)

## 2021-04-25 PROCEDURE — 99232 SBSQ HOSP IP/OBS MODERATE 35: CPT | Performed by: PSYCHIATRY & NEUROLOGY

## 2021-04-25 PROCEDURE — 80048 BASIC METABOLIC PNL TOTAL CA: CPT | Performed by: INTERNAL MEDICINE

## 2021-04-25 PROCEDURE — 99232 SBSQ HOSP IP/OBS MODERATE 35: CPT | Performed by: INTERNAL MEDICINE

## 2021-04-25 PROCEDURE — 99238 HOSP IP/OBS DSCHRG MGMT 30/<: CPT | Performed by: NURSE PRACTITIONER

## 2021-04-25 PROCEDURE — 86146 BETA-2 GLYCOPROTEIN ANTIBODY: CPT | Performed by: PSYCHIATRY & NEUROLOGY

## 2021-04-25 PROCEDURE — 85025 COMPLETE CBC W/AUTO DIFF WBC: CPT | Performed by: INTERNAL MEDICINE

## 2021-04-25 RX ORDER — ASPIRIN 81 MG/1
81 TABLET, CHEWABLE ORAL DAILY
Qty: 30 TABLET | Refills: 3 | Status: SHIPPED | OUTPATIENT
Start: 2021-04-26

## 2021-04-25 RX ORDER — ATORVASTATIN CALCIUM 40 MG/1
40 TABLET, FILM COATED ORAL NIGHTLY
Qty: 30 TABLET | Refills: 3 | Status: SHIPPED | OUTPATIENT
Start: 2021-04-25

## 2021-04-25 RX ORDER — ATORVASTATIN CALCIUM 40 MG/1
40 TABLET, FILM COATED ORAL NIGHTLY
Status: DISCONTINUED | OUTPATIENT
Start: 2021-04-25 | End: 2021-04-25 | Stop reason: HOSPADM

## 2021-04-25 RX ADMIN — ASPIRIN 81 MG: 81 TABLET, CHEWABLE ORAL at 08:09

## 2021-04-25 RX ADMIN — NICOTINE 1 PATCH: 14 PATCH, EXTENDED RELEASE TRANSDERMAL at 08:10

## 2021-04-25 NOTE — DISCHARGE SUMMARY
DISCHARGE SUMMARY     Admit date: 4/23/2021  Date of Discharge:  4/25/2021    Discharge Diagnoses  Active Hospital Problems    Diagnosis    • **Atypical variable neurological symptoms. Stroke ruled out and suspect possible conversion reaction        No past surgical history on file.    History of Present Illness    Patient is a 20 y.o. male presented with: Neurological symptoms who presented to [facility] ED on 4/23/21 at 1523 with acute onset (1503) of R facial droop, L facial numbness, HA, and LUE weakness/parasthesias.  He was found to be in atrial flutter as well which was a new finding. Initial NIHSS was 4.  He was transferred to our facility and was initially admitted to the floor as imaging did not reveal any acute findings.     We were contacted by the stroke Navigator and informed that Neurology felt there was a focal lesion and he would benefit from tPA; therefore, he was transferred to Neuro ICU.     In speaking with the patient he denied any history of medical issues.  He reported some chest pain approximately 2 weeks ago that was self-limiting, otherwise he has been in his regular level of health.       Hospital Course    The patient had an MRI on 4/23 and 4/24 and both were negative for infarction or acute findings. CT head 24 hours post tPA was normal and he was started on ASA. He was started on Lipitor 40 mg daily. Echocardiogram revealed normal EF and no PFO.  He continued to have fluctuating symptoms of right facial droop, LUE weakness despite negative imagining. Neurology felt that the symptoms could be related to a psychiatric/conversion reaction. Prior to discharge, Dr. Holland discussed the possibility of psychiatric/conversion reaction and the parents did not show surprise at the possibility. Evaluation by a mental health provider was recommended.  Hypercoagulable panel was performed and was pending at the time of discharge. He is to follow up with neurology in 3 months and results of  "hypercoagulable work-up will be discussed at that time.     Procedures Performed: None    Consults: Melissa Quintero MD, Neurology    /81 (BP Location: Left arm, Patient Position: Lying)   Pulse 65   Temp 98.2 °F (36.8 °C) (Oral)   Resp 14   Ht 182.9 cm (72\")   Wt 111 kg (244 lb 14.9 oz)   SpO2 99%   BMI 33.22 kg/m²     Physical Exam:  General Exam:  Well-developed well-nourished white male sitting up in chair in NAD.  Just completed lunch without difficulty  HEENT: Pupils equal and reactive. Nose and throat clear.  Right corner of mouth turned down, but during our discussion explaining the situation these findings would resolve, only to recur  Neck: Supple, no JVD, thyromegaly, or adenopathy  Lungs: Clear anteriorly and laterally.  Cardiovascular: RRR without murmurs or gallops.  Abdomen:  Soft nontender without organomegaly or masses.   and rectal: Deferred.  Extremities: No cyanosis clubbing edema.  Neurologic: Left arm and leg weakness, but walks to the bathroom without difficulty    Pertinent Test Results:   Results from last 7 days   Lab Units 04/25/21  0641   WBC 10*3/mm3 7.50   HEMOGLOBIN g/dL 14.6   HEMATOCRIT % 44.4   PLATELETS 10*3/mm3 232     Results from last 7 days   Lab Units 04/25/21  0641 04/24/21  0343 04/23/21 1959   SODIUM mmol/L 138 135* 135*   POTASSIUM mmol/L 4.1 4.3 3.8   CHLORIDE mmol/L 104 100 100   CO2 mmol/L 25.0 22.0 21.0*   BUN mg/dL 25* 17 15   CREATININE mg/dL 1.13 1.22 1.16   GLUCOSE mg/dL 97 149* 142*   CALCIUM mg/dL 9.1 8.9 8.8   PHOSPHORUS mg/dL  --  4.8*  --      Results from last 7 days   Lab Units 04/23/21 1959   HEMOGLOBIN A1C % 4.80       Condition on Discharge:  Stable    Discharge Disposition: Home or Self Care    Discharge Medications:      Discharge Medications      New Medications      Instructions Start Date   aspirin 81 MG chewable tablet   81 mg, Oral, Daily   Start Date: April 26, 2021     atorvastatin 40 MG tablet  Commonly known as: LIPITOR   40 mg, " Oral, Nightly             Discharge Diet: Diet Regular; Cardiac    Activity at Discharge: As tolerated    Follow-up Appointments  No future appointments.  Additional Instructions for the Follow-ups that You Need to Schedule     Discharge Follow-up with Specified Provider: Neurology; 3 Months   As directed      To: Neurology    Follow Up: 3 Months         Discharge Follow-up with Specified Provider: Recommend follow up with mental health professional; 2 Weeks   As directed      To: Recommend follow up with mental health professional    Follow Up: 2 Weeks               Test Results Pending at Discharge  Pending Labs     Order Current Status    DANY In process    Anticardiolipin Antibody, IgG / M, Qn In process    Antiphosphatidylserine IgG / M In process    Antiphosphotidyl Antibodies Panel II In process    Antithrombin III In process    Beta-2 Glycoprotein Antibodies In process    Factor 5 Activity In process    Factor 5 Leiden In process    Factor II, DNA Analysis In process    Lupus Anticoagulant In process    Phosphatidylserine Antibodies In process    Protein C Activity In process    Protein C Antigen, Total In process    Protein S Antigen, Free In process    Protein S Antigen, Total In process    Protein S Functional In process           Code Status and Medical Interventions:   Ordered at: 04/23/21 1914     Level Of Support Discussed With:    Patient     Code Status:    CPR     Medical Interventions (Level of Support Prior to Arrest):    Full       Heidi Khalil, APRN  04/25/21  13:41 EDT    Discharge Time Spent: 25 Minutes

## 2021-04-25 NOTE — PROGRESS NOTES
"Subjective:    CC: Cristian Perez is seen today for possible stroke    HPI:  Patient  having any left-sided weakness but states that he still feels tingling and numbness on that side.  His facial droop has also improved.  His repeat MRI brain that I personally reviewed did not show any acute intracranial abnormalities or chronic ischemic changes.  Echocardiogram also showed a normal EF with no PFO and normal valves.      Current Facility-Administered Medications:   •  acetaminophen (TYLENOL) suppository 650 mg, 650 mg, Rectal, Q4H PRN, Kane Arriaga APRN  •  alteplase (ACTIVASE) 81 mg in sterile water (preservative free) 81 mL (1 mg/mL) infusion, 81 mg, Intravenous, Once, Nory Major MD, Stopped at 04/23/21 2012  •  aspirin chewable tablet 81 mg, 81 mg, Oral, Daily, Samuel Holland MD, 81 mg at 04/25/21 0809  •  atorvastatin (LIPITOR) tablet 80 mg, 80 mg, Oral, Nightly, Nory Major MD, 80 mg at 04/24/21 2009  •  nicotine (NICODERM CQ) 14 MG/24HR patch 1 patch, 1 patch, Transdermal, Q24H, Govind Campbell APRN, 1 patch at 04/25/21 0810  •  sodium chloride 0.9 % flush 10 mL, 10 mL, Intravenous, Q12H, Nory Major MD, 10 mL at 04/24/21 2009  •  sodium chloride 0.9 % flush 10 mL, 10 mL, Intravenous, PRN, Nory Major MD       No past medical history on file.     No past surgical history on file.     No family history on file.     Social History     Socioeconomic History   • Marital status: Single     Spouse name: Not on file   • Number of children: Not on file   • Years of education: Not on file   • Highest education level: Not on file   Tobacco Use   • Smoking status: Never Smoker   • Smokeless tobacco: Never Used       Review of Systems Pertinent items are noted in HPI, all other systems reviewed and negative     Objective:    /81   Pulse 61   Temp 97.7 °F (36.5 °C) (Axillary)   Resp 16   Ht 182.9 cm (72\")   Wt 111 kg (244 lb 14.9 oz)   SpO2 98%   " BMI 33.22 kg/m²       Neurology Exam:    General appearance: NAD.     Mental status: Alert, awake and oriented to time place and person.    Recent and Remote memory: Intact.    Attention span and Concentration: Normal.     Language and Speech: Intact- No dysarthria.    Fluency, Naming, Repetition and Comprehension:  Intact    Cranial Nerves:   CN II: Visual fields are full. Intact. Fundi - Normal, No papilledema, Pupils - KRISTOPHER  CN III, IV and VI: Extraocular movements are intact. Normal saccades.   CN V: Facial sensation is intact.   CN VII: Muscles of facial expression reveal no asymmetry. Intact.   CN VIII: Hearing is intact. Whispered voice intact.   CN IX and X: Palate elevates symmetrically. Intact  CN XI: Shoulder shrug is intact.   CN XII: Tongue is midline without evidence of atrophy or fasciculation.     Motor:  Right UE muscle strength 5/5. Normal tone.     Left UE muscle strength 5/5. Normal tone.      Right LE muscle strength5/5. Normal tone.     Left LE muscle strength 5/5. Normal tone.      Sensory: Reduced to light touch in the left upper and lower extremity    DTRs: 2+ bilaterally in upper and lower extremities.    Babinski: Negative bilaterally.    Coordination: Normal finger-to-nose, heel to shin B/L.    Gait: Normal    Ophthalmoscopic examination-no papilledema noted    Cardiac examination -normal rate and rhythm    Labs:  Most recent labs have been reviewed.    Results from last 7 days   Lab Units 04/25/21  0641   WBC 10*3/mm3 7.50   HEMOGLOBIN g/dL 14.6   HEMATOCRIT % 44.4   PLATELETS 10*3/mm3 232     Results from last 7 days   Lab Units 04/25/21  0641   SODIUM mmol/L 138   POTASSIUM mmol/L 4.1   CHLORIDE mmol/L 104   CO2 mmol/L 25.0   BUN mg/dL 25*   CREATININE mg/dL 1.13   CALCIUM mg/dL 9.1       Radiology: CT Angiogram Neck    Result Date: 4/23/2021  Normal CTA head and neck without hemodynamically significant stenosis aneurysm or occlusion, specifically, no large vessel occlusion.   DICTATED:   04/23/2021 EDITED/ls :   04/23/2021   This report was finalized on 4/23/2021 7:09 PM by Dr. Bennett Reid.      MRI Brain Without Contrast    Result Date: 4/24/2021  Stable negative MRI brain without acute infarction or acute findings developed in the interim        MRI Brain Without Contrast    Result Date: 4/23/2021  1. No acute infarction.  2. No acute intracranial findings, specifically, no mass effect, midline shift or abnormal white matter disease signal aberration.  3. Mild mucosal edema and mucosal thickening of the paranasal sinuses concerning for sinusitis components as detailed above without air-fluid level.  DICTATED:   04/23/2021 EDITED/ls :   04/23/2021    This report was finalized on 4/23/2021 7:09 PM by Dr. Bennett Reid.      XR Chest 1 View    Result Date: 4/24/2021  No acute cardiopulmonary process.  DICTATED:   04/24/2021 EDITED/ls :   04/24/2021  This report was finalized on 4/24/2021 6:48 PM by Dr. Bennett Reid.      CT Head Without Contrast Stroke Protocol    Result Date: 4/23/2021  No acute intracranial findings. Specifically no acute intracranial hemorrhage.  DICTATED:   04/23/2021 EDITED/ls :   04/23/2021   This report was finalized on 4/23/2021 7:09 PM by Dr. Bennett Reid.      CT Angriogram head w ai analysis of lvo    Result Date: 4/23/2021  Normal CTA head and neck without hemodynamically significant stenosis aneurysm or occlusion, specifically, no large vessel occlusion.  DICTATED:   04/23/2021 EDITED/ls :   04/23/2021   This report was finalized on 4/23/2021 7:09 PM by Dr. Bennett Reid.      CT CEREBRAL PERFUSION WITH & WITHOUT CONTRAST    Result Date: 4/23/2021  No reversible ischemia within a specific vascular territory.  DICTATED:   04/23/2021 EDITED/ls :   04/23/2021   This report was finalized on 4/23/2021 7:09 PM by Dr. Bennett Reid.          Assessment and Plan:  20-year-old male who presented with right facial droop and left sided weakness/numbness.  Initial and  repeat MRI brain negative for any acute stroke     1.  TIA   Versus conversion disorder  He had fluctuating symptoms yesterday    -Continue   aspirin 81 mg daily and Lipitor 80 mg for goal LDL of less than 100.  His LDL was 159  -Hypercoagulable panel pending  -PT/OT    Neurology to sign off.  Call if needed patient can follow-up with outpatient neurology in 3 months    Melissa Quintero MD 04/25/21 10:50 EDT

## 2021-04-25 NOTE — PLAN OF CARE
Goal Outcome Evaluation:        Outcome Summary: No neuro changes.  VSS. UOP adequate.  Will continue to monitor.

## 2021-04-25 NOTE — PROGRESS NOTES
Intensivist Note     4/25/2021  Hospital Day: 2  * No surgery found *  ICU Stays Timeline            Hospital Admission: 04/23/21 1813 - Current  ICU stays: 1      In Date/Time Event Department ICU Stay Duration     04/23/21 1813 Admission  [facility] 3E      04/23/21 1926 Transfer In  [facility] 2B ICU 1 day 17 hours 25 minutes             Mr. Cristian Perez, 20 y.o. male is followed for:    Atypical variable neurological symptoms. Stroke ruled out and suspect possible conversion reaction       SUBJECTIVE     20 y.o. male  who presented to [facility] ED 4/23/2021 @ 1523 w/ acute onset  of R facial droop, L facial numbness, HA, & LUE/LLE weakness/parasthesias.  Was thought to be in atrial flutter as well which is a new finding (EKG however sent to [facility] apparently was sinus tachycardia).  NIHSS was 4.  He was transferred to our facility and was initially admitted to the floor as imaging did not reveal any acute findings (CTH, CTA, CTP all apparently negative).  MRI read by radiologist was read as negative except for some possible mild paranasal sinusitis.  Intensivist however was contacted by the CVA Navigator and informed that one of the neurologist felt there was a focal lesion and patient would benefit from tPA.  He was given TPA at 1800 and subsequently transferred to the ICU.  Patient denied any history of medical issues but did mention that he had some chest discomfort 2 weeks PTA that was self-limiting and not associated with exertion.  There was no associated radiation of pain, nausea, or vomiting.  Subsequent to coming to the ICU patient's symptoms did not change.  Was seen by neurology () who ordered another MRI, reviewed it herself, and said that both MRIs were normal.  She did obtain a hypercoagulable work-up just to be complete but from the note indicated that this could potentially be a conversion reaction    Interval history: No change overnight.  Upon entering the room patient still  "has the right facial droop, and left arm and leg weakness.  I am told that earlier this morning however he walked to the shower without difficulty.  Is hemodynamically stable, no arrhythmias have been noted, and O2 sats are excellent on room air.      Dr. Quintero has reviewed the situation and talk to the patient's nurse.  And her note it is apparent she feels this was most likely psychiatric in nature/conversion reaction.  Patient was not available to talk to her but her note indicates that he should just stay on aspirin, and since he has elevated lipids should take Lipitor.  She has offered neurologic follow-up in 3 months and they can follow-up his hypercoagulable panel.  In talking with  she recommends the patient follow-up with a psychologist/psychiatrist.  I sat down with the patient as well as his parents and explained this to them.  Interestingly enough there was very little reaction or surprise when I recommended psychological/psychiatric follow-up.      The patient's relevant PMH, PSH, FH, and SH were reviewed and updated in Epic as appropriate. Allergies and Medications reviewed.    OBJECTIVE     /81 (BP Location: Left arm, Patient Position: Lying)   Pulse 65   Temp 98.2 °F (36.8 °C) (Oral)   Resp 14   Ht 182.9 cm (72\")   Wt 111 kg (244 lb 14.9 oz)   SpO2 99%   BMI 33.22 kg/m²           Flowsheet Rows      First Filed Value   Admission Height  182.9 cm (72\") Documented at 04/23/2021 1934   Admission Weight  112 kg (247 lb 2.2 oz) Documented at 04/23/2021 1934        Intake & Output (last day)       04/24 0701 - 04/25 0700 04/25 0701 - 04/26 0700    P.O. 580 360    Total Intake(mL/kg) 580 (5.2) 360 (3.2)    Urine (mL/kg/hr)      Total Output      Net +580 +360          Urine Unmeasured Occurrence 1 x           Exam:  General Exam:  Well-developed well-nourished white male sitting up in chair in NAD.  Just completed lunch without difficulty  HEENT: Pupils equal and reactive. Nose and " throat clear.  Right corner of mouth turned down, but during our discussion explaining the situation these findings would resolve, only to recur  Neck:                          Supple, no JVD, thyromegaly, or adenopathy  Lungs: Clear anteriorly and laterally.  Cardiovascular: RRR without murmurs or gallops.  Abdomen: Soft nontender without organomegaly or masses.   and rectal: Deferred.  Extremities: No cyanosis clubbing edema.  Neurologic:                 Left arm and leg weakness, but walks to the bathroom without difficulty    Chest X-Ray: No film    Results from last 7 days   Lab Units 04/25/21  0641 04/24/21  0343 04/23/21 1959   WBC 10*3/mm3 7.50 6.70 16.20*   HEMOGLOBIN g/dL 14.6 15.3 16.2   HEMATOCRIT % 44.4 45.0 46.9   PLATELETS 10*3/mm3 232 293 308     Results from last 7 days   Lab Units 04/25/21  0641 04/24/21  0343   SODIUM mmol/L 138 135*   POTASSIUM mmol/L 4.1 4.3   CHLORIDE mmol/L 104 100   CO2 mmol/L 25.0 22.0   BUN mg/dL 25* 17   CREATININE mg/dL 1.13 1.22   GLUCOSE mg/dL 97 149*   CALCIUM mg/dL 9.1 8.9     Results from last 7 days   Lab Units 04/24/21  0343   MAGNESIUM mg/dL 1.9   PHOSPHORUS mg/dL 4.8*     Results from last 7 days   Lab Units 04/23/21 1959   ALK PHOS U/L 108   BILIRUBIN mg/dL 0.5   ALT (SGPT) U/L 38   AST (SGOT) U/L 19       No results found for: SEDRATE  No results found for: BNP  No results found for: CKTOTAL, CKMB, CKMBINDEX, TROPONINI, TROPONINT  Lab Results   Component Value Date    TSH 0.932 04/23/2021     No results found for: LACTATE  No results found for: CORTISOL      I reviewed the patient's results, images and medication.    Assessment/Plan   ASSESSMENT        Atypical variable neurological symptoms. Stroke ruled out and suspect possible conversion reaction      DISCUSSION: Neurology feels this is most likely a conversion reaction.  After talking with the patient and his parents and watching the patient I agree the right facial weakness is variable and at times  completely resolves.  At one point in the conversation the patient turned his head away from myself and his parents and appeared upset.  That his features calmed.  The most striking thing about the conversation was the lack of reaction from the parents making me suspect there have been issues in the past that we do not know about.  What ever the case Dr Quintero indicates that he should take aspirin once a day as that will cause no problems for him.  In addition because of his hyperlipidemia she recommends Lipitor.  She is offered follow-up in 3 months at that point in time his hypercoagulable panel can be reviewed    PLAN     1.  Discharge home  2.  I have recommended psychological/psychiatric follow-up  3.  Will make an appointment for patient in neurology clinic in 3 months  4.  Aspirin 81 mg daily  5.  Lipitor 40 mg daily rather than high-dose    Plan of care and goals reviewed with multidisciplinary team at daily rounds.    I discussed the patient's findings and my recommendations with patient, family, nursing staff and consulting provider    Time spent Critical care 25 min (It does not include procedure time).    Electronically signed by Samuel Holland MD, 04/25/21, 12:51 PM EDT.   Pulmonary / Critical care medicine     Electronically signed by Samuel Holland MD, 04/25/21, 12:51 PM EDT.

## 2021-04-25 NOTE — DISCHARGE INSTRUCTIONS
[facility] Case Management [phone] (call to set-up physical therapy appointment)

## 2021-04-25 NOTE — CASE MANAGEMENT/SOCIAL WORK
Continued Stay Note  [facility]     Patient Name: Cristian Perez  MRN: [id number]  Today's Date: 4/25/2021    Admit Date: 4/23/2021    Discharge Plan     Row Name 04/25/21 1416       Plan    Plan  Home    Patient/Family in Agreement with Plan  yes    Plan Comments  Spoke with patient's dad over the phone. He denied dc needs and will transport today.    Final Discharge Disposition Code  01 - home or self-care        Discharge Codes    No documentation.       Expected Discharge Date and Time     Expected Discharge Date Expected Discharge Time    Apr 25, 2021             Padmini Villagomez RN

## 2021-04-25 NOTE — PLAN OF CARE
Goal Outcome Evaluation:  Plan of Care Reviewed With: patient, family  Progress: improving  Outcome Summary: Patient conts to c/o decreased sensation and weakness in L extremities, and improving R facial droop. Otherwise neurologically intact. Plan to discharge patient home with family.

## 2021-04-26 LAB
ANA SER QL: NEGATIVE
AT III ACT/NOR PPP CHRO: 139 % (ref 75–135)
CARDIOLIPIN IGG SER IA-ACNC: <9 GPL U/ML (ref 0–14)
CARDIOLIPIN IGM SER IA-ACNC: 19 MPL U/ML (ref 0–12)
F5 GENE MUT ANL BLD/T: NORMAL
FACT V ACT/NOR PPP: 134 % (ref 70–150)
FACTOR II, DNA ANALYSIS: NORMAL
PROT S ACT/NOR PPP: 120 % (ref 63–140)
PROT S AG ACT/NOR PPP IA: 107 % (ref 60–150)
PROT S FREE AG ACT/NOR PPP IA: 139 % (ref 57–157)
QT INTERVAL: 348 MS
QTC INTERVAL: 444 MS

## 2021-04-26 PROCEDURE — 93010 ELECTROCARDIOGRAM REPORT: CPT | Performed by: INTERNAL MEDICINE

## 2021-04-27 LAB
APTT SCREEN TO CONFIRM RATIO: 0.92 RATIO (ref 0–1.4)
CONFIRM APTT/NORMAL: 31.6 SEC (ref 0–55)
LA 2 SCREEN W REFLEX-IMP: NORMAL
PROT C ACT/NOR PPP CHRO: 156 %
PROT C AG ACT/NOR PPP IA: 164 % (ref 60–150)
SCREEN APTT: 32.8 SEC (ref 0–51.9)
SCREEN DRVVT: 31.3 SEC (ref 0–47)
THROMBIN TIME: 21.6 SEC (ref 0–23)

## 2021-04-28 LAB
B2 GLYCOPROT1 IGA SER-ACNC: <9 GPI IGA UNITS (ref 0–25)
B2 GLYCOPROT1 IGG SER-ACNC: <9 GPI IGG UNITS (ref 0–20)
B2 GLYCOPROT1 IGM SER-ACNC: <9 GPI IGM UNITS (ref 0–32)

## 2021-04-29 LAB
PS IGA SER-ACNC: 1 APS IGA (ref 0–20)
PS IGG SER-ACNC: 4 GPS IGG (ref 0–11)
PS IGG SER-ACNC: 5 GPS IGG (ref 0–11)
PS IGM SER-ACNC: 15 MPS IGM (ref 0–25)
PS IGM SER-ACNC: 22 MPS IGM (ref 0–25)

## 2021-05-05 LAB
ANTI-PHOSPHATIDIC ACID: ABNORMAL
ANTI-PHOSPHATIDYL GLYCEROL: ABNORMAL
ANTI-PHOSPHATIDYL INOSITOL: ABNORMAL
ANTI-PHOSPHATIDYLETHANOLAMINE: ABNORMAL
PE IGA SER-ACNC: 1.2 U/ML
PE IGG SER-ACNC: 4.9 U/ML
PE IGM SER-ACNC: 22.9 U/ML
PG IGA SER-ACNC: 1.2 U/ML
PG IGG SER-ACNC: 5.8 U/ML
PG IGM SER-ACNC: 4.4 U/ML
PHOSPHATIDATE IGA SER-ACNC: 2.1 U/ML
PHOSPHATIDATE IGG SER-ACNC: 4 U/ML
PHOSPHATIDATE IGM SER-ACNC: 5.5 U/ML
PI IGA SER-ACNC: 2.2 U/ML
PI IGG SER-ACNC: 3.9 U/ML
PI IGM SER-ACNC: 2.4 U/ML

## 2021-07-04 ENCOUNTER — HOSPITAL ENCOUNTER (EMERGENCY)
Facility: HOSPITAL | Age: 21
Discharge: HOME | End: 2021-07-05
Attending: FAMILY MEDICINE | Admitting: FAMILY MEDICINE

## 2021-07-04 DIAGNOSIS — M79.602 LEFT ARM PAIN: ICD-10-CM
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
## 2021-07-04 PROCEDURE — 99284 EMERGENCY DEPT VISIT MOD MDM: CPT

## 2021-07-04 PROCEDURE — 73090 X-RAY EXAM OF FOREARM: CPT

## 2021-07-04 PROCEDURE — 73060 X-RAY EXAM OF HUMERUS: CPT

## 2021-07-05 ENCOUNTER — APPOINTMENT (OUTPATIENT)
Dept: CT IMAGING | Facility: HOSPITAL | Age: 21
End: 2021-07-05

## 2021-07-05 ENCOUNTER — APPOINTMENT (OUTPATIENT)
Dept: GENERAL RADIOLOGY | Facility: HOSPITAL | Age: 21
End: 2021-07-05

## 2021-07-05 VITALS
RESPIRATION RATE: 16 BRPM | SYSTOLIC BLOOD PRESSURE: 110 MMHG | HEART RATE: 77 BPM | BODY MASS INDEX: 31.15 KG/M2 | WEIGHT: 230 LBS | OXYGEN SATURATION: 97 % | DIASTOLIC BLOOD PRESSURE: 74 MMHG | HEIGHT: 72 IN | TEMPERATURE: 98.6 F

## 2021-07-05 LAB
ALBUMIN SERPL-MCNC: 4.73 G/DL (ref 3.5–5.2)
ALBUMIN/GLOB SERPL: 1.7 G/DL
ALP SERPL-CCNC: 103 U/L (ref 39–117)
ALT SERPL W P-5'-P-CCNC: 31 U/L (ref 1–41)
ANION GAP SERPL CALCULATED.3IONS-SCNC: 14.1 MMOL/L (ref 5–15)
AST SERPL-CCNC: 25 U/L (ref 1–40)
BASOPHILS # BLD AUTO: 0.08 10*3/MM3 (ref 0–0.2)
BASOPHILS NFR BLD AUTO: 0.7 % (ref 0–1.5)
BILIRUB SERPL-MCNC: 0.3 MG/DL (ref 0–1.2)
BUN SERPL-MCNC: 12 MG/DL (ref 6–20)
BUN/CREAT SERPL: 10.3 (ref 7–25)
CALCIUM SPEC-SCNC: 9 MG/DL (ref 8.6–10.5)
CHLORIDE SERPL-SCNC: 103 MMOL/L (ref 98–107)
CO2 SERPL-SCNC: 22.9 MMOL/L (ref 22–29)
CREAT SERPL-MCNC: 1.17 MG/DL (ref 0.76–1.27)
DEPRECATED RDW RBC AUTO: 38.2 FL (ref 37–54)
EOSINOPHIL # BLD AUTO: 0.19 10*3/MM3 (ref 0–0.4)
EOSINOPHIL NFR BLD AUTO: 1.8 % (ref 0.3–6.2)
ERYTHROCYTE [DISTWIDTH] IN BLOOD BY AUTOMATED COUNT: 12.4 % (ref 12.3–15.4)
ETHANOL BLD-MCNC: 188 MG/DL (ref 0–10)
ETHANOL UR QL: 0.19 %
GFR SERPL CREATININE-BSD FRML MDRD: 78 ML/MIN/1.73
GLOBULIN UR ELPH-MCNC: 2.8 GM/DL
GLUCOSE SERPL-MCNC: 99 MG/DL (ref 65–99)
HCT VFR BLD AUTO: 43.4 % (ref 37.5–51)
HGB BLD-MCNC: 15.5 G/DL (ref 13–17.7)
IMM GRANULOCYTES # BLD AUTO: 0.04 10*3/MM3 (ref 0–0.05)
IMM GRANULOCYTES NFR BLD AUTO: 0.4 % (ref 0–0.5)
LYMPHOCYTES # BLD AUTO: 1.89 10*3/MM3 (ref 0.7–3.1)
LYMPHOCYTES NFR BLD AUTO: 17.5 % (ref 19.6–45.3)
MCH RBC QN AUTO: 30.8 PG (ref 26.6–33)
MCHC RBC AUTO-ENTMCNC: 35.7 G/DL (ref 31.5–35.7)
MCV RBC AUTO: 86.1 FL (ref 79–97)
MONOCYTES # BLD AUTO: 0.68 10*3/MM3 (ref 0.1–0.9)
MONOCYTES NFR BLD AUTO: 6.3 % (ref 5–12)
NEUTROPHILS NFR BLD AUTO: 7.9 10*3/MM3 (ref 1.7–7)
NEUTROPHILS NFR BLD AUTO: 73.3 % (ref 42.7–76)
NRBC BLD AUTO-RTO: 0 /100 WBC (ref 0–0.2)
PLATELET # BLD AUTO: 246 10*3/MM3 (ref 140–450)
PMV BLD AUTO: 8.3 FL (ref 6–12)
POTASSIUM SERPL-SCNC: 3.9 MMOL/L (ref 3.5–5.2)
PROT SERPL-MCNC: 7.5 G/DL (ref 6–8.5)
RBC # BLD AUTO: 5.04 10*6/MM3 (ref 4.14–5.8)
SODIUM SERPL-SCNC: 140 MMOL/L (ref 136–145)
WBC # BLD AUTO: 10.78 10*3/MM3 (ref 3.4–10.8)

## 2021-07-05 PROCEDURE — 80053 COMPREHEN METABOLIC PANEL: CPT | Performed by: FAMILY MEDICINE

## 2021-07-05 PROCEDURE — 73090 X-RAY EXAM OF FOREARM: CPT

## 2021-07-05 PROCEDURE — 73060 X-RAY EXAM OF HUMERUS: CPT

## 2021-07-05 PROCEDURE — 0 IOPAMIDOL PER 1 ML: Performed by: FAMILY MEDICINE

## 2021-07-05 PROCEDURE — 36415 COLL VENOUS BLD VENIPUNCTURE: CPT

## 2021-07-05 PROCEDURE — 72125 CT NECK SPINE W/O DYE: CPT

## 2021-07-05 PROCEDURE — 73070 X-RAY EXAM OF ELBOW: CPT

## 2021-07-05 PROCEDURE — 72128 CT CHEST SPINE W/O DYE: CPT

## 2021-07-05 PROCEDURE — 73030 X-RAY EXAM OF SHOULDER: CPT

## 2021-07-05 PROCEDURE — 74178 CT ABD&PLV WO CNTR FLWD CNTR: CPT

## 2021-07-05 PROCEDURE — 96374 THER/PROPH/DIAG INJ IV PUSH: CPT

## 2021-07-05 PROCEDURE — 72131 CT LUMBAR SPINE W/O DYE: CPT

## 2021-07-05 PROCEDURE — 70450 CT HEAD/BRAIN W/O DYE: CPT

## 2021-07-05 PROCEDURE — 25010000002 MORPHINE PER 10 MG: Performed by: FAMILY MEDICINE

## 2021-07-05 PROCEDURE — 85025 COMPLETE CBC W/AUTO DIFF WBC: CPT | Performed by: FAMILY MEDICINE

## 2021-07-05 PROCEDURE — 82077 ASSAY SPEC XCP UR&BREATH IA: CPT | Performed by: FAMILY MEDICINE

## 2021-07-05 PROCEDURE — 73080 X-RAY EXAM OF ELBOW: CPT

## 2021-07-05 PROCEDURE — 71275 CT ANGIOGRAPHY CHEST: CPT

## 2021-07-05 RX ORDER — HYDROCODONE BITARTRATE AND ACETAMINOPHEN 10; 325 MG/1; MG/1
1 TABLET ORAL EVERY 6 HOURS PRN
Qty: 12 TABLET | Refills: 0 | Status: SHIPPED | OUTPATIENT
Start: 2021-07-05

## 2021-07-05 RX ORDER — HYDROCODONE BITARTRATE AND ACETAMINOPHEN 5; 325 MG/1; MG/1
1 TABLET ORAL ONCE
Status: COMPLETED | OUTPATIENT
Start: 2021-07-05 | End: 2021-07-05

## 2021-07-05 RX ADMIN — HYDROCODONE BITARTRATE AND ACETAMINOPHEN 1 TABLET: 5; 325 TABLET ORAL at 04:02

## 2021-07-05 RX ADMIN — IOPAMIDOL 100 ML: 755 INJECTION, SOLUTION INTRAVENOUS at 02:38

## 2021-07-05 RX ADMIN — SODIUM CHLORIDE 1000 ML: 9 INJECTION, SOLUTION INTRAVENOUS at 01:20

## 2021-07-05 RX ADMIN — MORPHINE SULFATE 4 MG: 4 INJECTION, SOLUTION INTRAMUSCULAR; INTRAVENOUS at 01:20
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
## 2021-07-05 NOTE — DISCHARGE INSTRUCTIONS
Call one of the offices below to establish a primary care provider.  If you are unable to get an appointment and feel it is an emergency and need to be seen immediately please return to the Emergency Department.    Call one of the office below to set up a primary care provider.    Dr. Dave Hi                                                                                                       [address] [facility] [address]  [phone]    Dr. Mercado, Dr. LAVERN Putnam, Dr. STAN Putnam ([facility])  [address] [facility] [address]  [phone]    Dr. Henson, Dr. Cross, Dr. Tanner ([facility])  [address] [facility] [address]  [phone]    Dr. Ravi  [address] [facility] [address]  [phone]    Dr. Patrick, Dr. Hutchinson, Dr. Delacruz, Dr. Montilla ([facility])  [address] [place] [address] [address]  [facility] [address]  [phone]    Dr. Doris De Leon  [address] [facility] [place] [address]  [phone]    Dr. Silvia Johnson  [address]   [address]  [facility] [address]  [phone]    Dr. Hi  [address] [facility] [address]  [phone]    Dr. Collier, Dr. Mckeon  [address] [facility] [place] [address]  [phone]    Dr. Remy  [address] [address]                                                              [address]  [facility] [address]  [phone]    Dr. Cramer  [address] [facility] [id number] [phone]    Dr. Marisol Dobson  [address]  [address]  [facility] [address]  [phone]
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
